# Patient Record
Sex: MALE | Race: WHITE | Employment: OTHER | URBAN - METROPOLITAN AREA
[De-identification: names, ages, dates, MRNs, and addresses within clinical notes are randomized per-mention and may not be internally consistent; named-entity substitution may affect disease eponyms.]

---

## 2017-04-28 RX ORDER — LANSOPRAZOLE 30 MG/1
30 CAPSULE, DELAYED RELEASE ORAL
Qty: 90 CAP | Refills: 3 | Status: SHIPPED | OUTPATIENT
Start: 2017-04-28 | End: 2018-05-25 | Stop reason: SDUPTHER

## 2017-08-11 ENCOUNTER — TELEPHONE (OUTPATIENT)
Dept: INTERNAL MEDICINE CLINIC | Age: 57
End: 2017-08-11

## 2017-08-11 NOTE — TELEPHONE ENCOUNTER
Patient went to get his Lansoprazole from the pharmacy but was not able to. It is requiring a prior auth.

## 2017-11-02 ENCOUNTER — HOSPITAL ENCOUNTER (OUTPATIENT)
Dept: LAB | Age: 57
Discharge: HOME | End: 2017-11-02
Payer: COMMERCIAL

## 2017-11-02 LAB
ALBUMIN SERPL-MCNC: 4.6 G/DL (ref 3.4–5)
ALBUMIN/GLOB SERPL: 1.5 {RATIO} (ref 0.8–1.7)
ALP SERPL-CCNC: 94 U/L (ref 45–117)
ALT SERPL-CCNC: 87 U/L (ref 16–61)
ANION GAP SERPL CALC-SCNC: 7 MMOL/L (ref 3–18)
APPEARANCE UR: CLEAR
AST SERPL-CCNC: 34 U/L (ref 15–37)
BILIRUB SERPL-MCNC: 0.9 MG/DL (ref 0.2–1)
BILIRUB UR QL: NEGATIVE
BUN SERPL-MCNC: 19 MG/DL (ref 7–18)
BUN/CREAT SERPL: 19 (ref 12–20)
CALCIUM SERPL-MCNC: 9.5 MG/DL (ref 8.5–10.1)
CHLORIDE SERPL-SCNC: 103 MMOL/L (ref 100–108)
CHOLEST SERPL-MCNC: 188 MG/DL
CO2 SERPL-SCNC: 31 MMOL/L (ref 21–32)
COLOR UR: YELLOW
CREAT SERPL-MCNC: 0.99 MG/DL (ref 0.6–1.3)
ERYTHROCYTE [DISTWIDTH] IN BLOOD BY AUTOMATED COUNT: 13.4 % (ref 11.6–14.5)
GLOBULIN SER CALC-MCNC: 3.1 G/DL (ref 2–4)
GLUCOSE SERPL-MCNC: 94 MG/DL (ref 74–99)
GLUCOSE UR STRIP.AUTO-MCNC: NEGATIVE MG/DL
HCT VFR BLD AUTO: 50.6 % (ref 36–48)
HDLC SERPL-MCNC: 53 MG/DL (ref 40–60)
HDLC SERPL: 3.5 {RATIO} (ref 0–5)
HGB BLD-MCNC: 16.9 G/DL (ref 13–16)
HGB UR QL STRIP: NEGATIVE
KETONES UR QL STRIP.AUTO: NEGATIVE MG/DL
LDLC SERPL CALC-MCNC: 116.8 MG/DL (ref 0–100)
LEUKOCYTE ESTERASE UR QL STRIP.AUTO: NEGATIVE
LIPID PROFILE,FLP: ABNORMAL
MCH RBC QN AUTO: 32.9 PG (ref 24–34)
MCHC RBC AUTO-ENTMCNC: 33.4 G/DL (ref 31–37)
MCV RBC AUTO: 98.4 FL (ref 74–97)
NITRITE UR QL STRIP.AUTO: NEGATIVE
PH UR STRIP: 5.5 [PH] (ref 5–8)
PLATELET # BLD AUTO: 217 K/UL (ref 135–420)
PMV BLD AUTO: 10.5 FL (ref 9.2–11.8)
POTASSIUM SERPL-SCNC: 4.2 MMOL/L (ref 3.5–5.5)
PROT SERPL-MCNC: 7.7 G/DL (ref 6.4–8.2)
PROT UR STRIP-MCNC: NEGATIVE MG/DL
RBC # BLD AUTO: 5.14 M/UL (ref 4.7–5.5)
SODIUM SERPL-SCNC: 141 MMOL/L (ref 136–145)
SP GR UR REFRACTOMETRY: 1.02 (ref 1–1.03)
TRIGL SERPL-MCNC: 91 MG/DL (ref ?–150)
UROBILINOGEN UR QL STRIP.AUTO: 0.2 EU/DL (ref 0.2–1)
VLDLC SERPL CALC-MCNC: 18.2 MG/DL
WBC # BLD AUTO: 6.6 K/UL (ref 4.6–13.2)

## 2017-11-02 PROCEDURE — 80053 COMPREHEN METABOLIC PANEL: CPT | Performed by: INTERNAL MEDICINE

## 2017-11-02 PROCEDURE — 85027 COMPLETE CBC AUTOMATED: CPT | Performed by: INTERNAL MEDICINE

## 2017-11-02 PROCEDURE — 81003 URINALYSIS AUTO W/O SCOPE: CPT | Performed by: INTERNAL MEDICINE

## 2017-11-02 PROCEDURE — 80061 LIPID PANEL: CPT | Performed by: INTERNAL MEDICINE

## 2017-11-02 PROCEDURE — 36415 COLL VENOUS BLD VENIPUNCTURE: CPT | Performed by: INTERNAL MEDICINE

## 2017-11-07 PROBLEM — K76.0 FATTY LIVER: Status: ACTIVE | Noted: 2017-11-07

## 2017-11-07 PROBLEM — N13.8 BPH WITH OBSTRUCTION/LOWER URINARY TRACT SYMPTOMS: Status: ACTIVE | Noted: 2017-11-07

## 2017-11-07 PROBLEM — N40.1 BPH WITH OBSTRUCTION/LOWER URINARY TRACT SYMPTOMS: Status: ACTIVE | Noted: 2017-11-07

## 2017-11-07 NOTE — PROGRESS NOTES
59 y.o. white male who presents for RPE    Reports no cardiovascular complaints. He remains active with household and lawn chores although no set exercise regimen. He tried to get the treadmill going but it  on him. He has the free time being retired, just not going. BP up today but not previously documented     Vitals 2017 10/28/2016 10/26/2015 2015 2014   Blood Pressure 144/86 138/88 136/84 112/76 132/78     The gerd is controlled and no other gi complaints    No urinary complaints outside of mild hesitation    He has a mouth lesion he wants looked at     Past Medical History:   Diagnosis Date    BPH with obstruction/lower urinary tract symptoms     I-PSS 13 from     Calculus of kidney     Colon polyp     Dr Gabriel Moreira 9/10    Depression     Dyslipidemia     calculated 10 year risk score 7.6% (10/15); 6.4% (10/16)    Fatty liver     Us  negative serologies; Fib-4 was 0.87 from ()    FHx: heart disease     GERD (gastroesophageal reflux disease)     EGD 10/14 Dr Gerald Asif    Left-sided tinnitus     and hearing loss; saw Dr Eleno Serras ?    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 [email or web address]      Social History Main Topics    Smoking status: Former Smoker    Smokeless tobacco: Former User    Alcohol use Yes      Comment: occasionally    Drug use: No    Sexual activity: Not on file     Other Topics Concern    Not on file     Social History Narrative     Family History   Problem Relation Age of Onset    Cancer Mother 80     gallbadder cancer    Dementia Mother     Heart Disease Father     Hypertension Father      Immunization History   Administered Date(s) Administered    Influenza Vaccine (Quad) PF 11/09/2017    Tdap 12/09/2014     Current Outpatient Prescriptions   Medication Sig    lansoprazole (PREVACID) 30 mg capsule Take 1 Cap by mouth Daily (before breakfast). No current facility-administered medications for this visit. Allergies   Allergen Reactions    Aleve [Naproxen Sodium] Rash     REVIEW OF SYSTEMS: colo 9/10 Dr Jakob Love  Ophtho - no vision change or eye pain  Oral - no mouth pain, tongue or tooth problems  Ears - no hearing loss, ear pain, fullness, no swallowing problems  Cardiac - no CP, PND, orthopnea, edema, palpitations or syncope  Chest - no breast masses  Resp - no wheezing, chronic coughing, dyspnea  Genitals - no genital lesions, discharge, masses, ulceration, warts  Ortho - no swelling, dec ROM, myalgias  Derm - no nail abnormalities, rashes, lesions of note, hair loss  Psych - denies any anxiety or depression symptoms, no hallucinations or violent ideation  Constitutional - no wt loss, night sweats, unexplained fevers  Neuro - no focal weakness, numbness, paresthesias, incoordination, ataxia, involuntary movements  Endo - no polyuria, polydipsia, nocturia, hot flashes    Visit Vitals    /86 (BP 1 Location: Right arm, BP Patient Position: Sitting)    Pulse 71    Temp 98 °F (36.7 °C) (Oral)    Resp 14    Ht 5' 10.25\" (1.784 m)    Wt 208 lb 9.6 oz (94.6 kg)    SpO2 97%    BMI 29.72 kg/m2   Affect is appropriate. Mood stable  No apparent distress  HEENT --Anicteric sclerae, tympanic membranes normal,  ear canals normal.  PERRL, EOMI, conjunctiva and lids normal.  Disks were sharp  Sinuses were nontender, turbinates normal, hearing normal.  Oropharynx without  erythema, normal tongue, oral mucosa and tonsils. No thyromegaly, JVD, or bruits. Slightly raised purplish lesion about 8mm diameter left inner upper lip  Lungs --Clear to auscultation, normal percussion.   Heart --Regular rate and rhythm, no murmurs, rubs, gallops, or clicks. Chest wall --Nontender to palpation. PMI normal.  Abdomen -- Soft and nontender, no hepatosplenomegaly or masses. Reducible umbilical hernia, also ventral hernia  Prostate  -- no asymmetry, nodularity, tenderness, about 25 gm  Rectal  -- normal tone, guiaiac negative brown stool  Extremities -- Without cyanosis, clubbing, edema. 2+ pulses equally and bilaterally.     LABS  From 7/10 showed   gluc 83, cr 0.90, gfr>60, alt 63,        chol 156, tg 67,   hdl 46, ldl-c 97,   wbc 5.3, hb 16.1, plt 198, tsh 1.67, psa 0.60  From 7/14 showed   gluc 88, cr 1.04, gfr>60, alt 57, hba1c 5.3, chol 187, tg 194, hdl 43, ldl-c 105, wbc 7.1, hb 17.6, plt 207, tsh 1.80, psa 0.90, ua neg  From 8/14 showed                 alt 71,                                 ast 34, ap 83, tb 0.6, hep b/c neg, bhavin neg, fe 101, %sat 31, ferritin 241, cerulo 17.5  From 2/15 showed   gluc 96, cr 1.02, gfr>60, alt 37  From 10/15 showed gluc 92, cr 0.95, gfr>60, alt 27,        chol 185, tg 104, hdl 44, ldl-c 120  From 10/16 showed gluc 92, cr 1.07, gfr>60, alt 62,        chol 194, tg 47,   hdl 59, ldl-c 126, wbc 5.4, hb 16.7, plt 185,            psa 0.80, ua neg    Results for orders placed or performed during the hospital encounter of 11/02/17   CBC W/O DIFF   Result Value Ref Range    WBC 6.6 4.6 - 13.2 K/uL    RBC 5.14 4.70 - 5.50 M/uL    HGB 16.9 (H) 13.0 - 16.0 g/dL    HCT 50.6 (H) 36.0 - 48.0 %    MCV 98.4 (H) 74.0 - 97.0 FL    MCH 32.9 24.0 - 34.0 PG    MCHC 33.4 31.0 - 37.0 g/dL    RDW 13.4 11.6 - 14.5 %    PLATELET 677 989 - 020 K/uL    MPV 10.5 9.2 - 11.8 FL   LIPID PANEL   Result Value Ref Range    LIPID PROFILE          Cholesterol, total 188 <200 MG/DL    Triglyceride 91 <150 MG/DL    HDL Cholesterol 53 40 - 60 MG/DL    LDL, calculated 116.8 (H) 0 - 100 MG/DL    VLDL, calculated 18.2 MG/DL    CHOL/HDL Ratio 3.5 0 - 5.0     METABOLIC PANEL, COMPREHENSIVE   Result Value Ref Range    Sodium 141 136 - 145 mmol/L    Potassium 4.2 3.5 - 5.5 mmol/L    Chloride 103 100 - 108 mmol/L    CO2 31 21 - 32 mmol/L    Anion gap 7 3.0 - 18 mmol/L    Glucose 94 74 - 99 mg/dL    BUN 19 (H) 7.0 - 18 MG/DL    Creatinine 0.99 0.6 - 1.3 MG/DL    BUN/Creatinine ratio 19 12 - 20      GFR est AA >60 >60 ml/min/1.73m2    GFR est non-AA >60 >60 ml/min/1.73m2    Calcium 9.5 8.5 - 10.1 MG/DL    Bilirubin, total 0.9 0.2 - 1.0 MG/DL    ALT (SGPT) 87 (H) 16 - 61 U/L    AST (SGOT) 34 15 - 37 U/L    Alk. phosphatase 94 45 - 117 U/L    Protein, total 7.7 6.4 - 8.2 g/dL    Albumin 4.6 3.4 - 5.0 g/dL    Globulin 3.1 2.0 - 4.0 g/dL    A-G Ratio 1.5 0.8 - 1.7     URINALYSIS W/ RFLX MICROSCOPIC   Result Value Ref Range    Color YELLOW      Appearance CLEAR      Specific gravity 1.021 1.005 - 1.030      pH (UA) 5.5 5.0 - 8.0      Protein NEGATIVE  NEG mg/dL    Glucose NEGATIVE  NEG mg/dL    Ketone NEGATIVE  NEG mg/dL    Bilirubin NEGATIVE  NEG      Blood NEGATIVE  NEG      Urobilinogen 0.2 0.2 - 1.0 EU/dL    Nitrites NEGATIVE  NEG      Leukocyte Esterase NEGATIVE  NEG       Calculated 10 year risk score was 8.7%    Patient Active Problem List   Diagnosis Code    Dyslipidemia E78.5    Colon polyp Dr Timbo Romero 2010 K63.5    Gastroesophageal reflux disease without esophagitis K21.9    Fatty liver K76.0    BPH with obstruction/lower urinary tract symptoms N40.1, N13.8     Assessment and plan:  1. GERD. Continue ppi and avoidance measures  2. Dyslipidemia. Dietary and lifestyle measures for now, declined meds. Will inc exercise levels  3. BPH/LUTS. Declined meds or urology eval as minimal sx  4. Fatty liver. Wt loss, inc exercise  5. Polyp. Fiber, colo 2020  6. Mouth lesion. Sched Dr Radha Smallwood group  7. Elev bp. Will follow outside. DASH diet, exercise, wt loss, call if persistently elevated        RTC 10/18    Above conditions discussed at length and patient vocalized understanding.   All questions answered to patient satifaction

## 2017-11-09 ENCOUNTER — OFFICE VISIT (OUTPATIENT)
Dept: INTERNAL MEDICINE CLINIC | Age: 57
End: 2017-11-09

## 2017-11-09 ENCOUNTER — HOSPITAL ENCOUNTER (OUTPATIENT)
Dept: LAB | Age: 57
Discharge: HOME | End: 2017-11-09
Payer: COMMERCIAL

## 2017-11-09 VITALS
RESPIRATION RATE: 14 BRPM | WEIGHT: 208.6 LBS | SYSTOLIC BLOOD PRESSURE: 144 MMHG | BODY MASS INDEX: 29.86 KG/M2 | HEART RATE: 71 BPM | HEIGHT: 70 IN | TEMPERATURE: 98 F | DIASTOLIC BLOOD PRESSURE: 86 MMHG | OXYGEN SATURATION: 97 %

## 2017-11-09 DIAGNOSIS — N40.1 BPH WITH OBSTRUCTION/LOWER URINARY TRACT SYMPTOMS: ICD-10-CM

## 2017-11-09 DIAGNOSIS — K13.70 MOUTH LESION: ICD-10-CM

## 2017-11-09 DIAGNOSIS — Z00.00 PHYSICAL EXAM: ICD-10-CM

## 2017-11-09 DIAGNOSIS — K21.9 GASTROESOPHAGEAL REFLUX DISEASE WITHOUT ESOPHAGITIS: ICD-10-CM

## 2017-11-09 DIAGNOSIS — Z00.00 PHYSICAL EXAM: Primary | ICD-10-CM

## 2017-11-09 DIAGNOSIS — N13.8 BPH WITH OBSTRUCTION/LOWER URINARY TRACT SYMPTOMS: ICD-10-CM

## 2017-11-09 DIAGNOSIS — Z12.5 SCREENING PSA (PROSTATE SPECIFIC ANTIGEN): ICD-10-CM

## 2017-11-09 DIAGNOSIS — K76.0 FATTY LIVER: ICD-10-CM

## 2017-11-09 DIAGNOSIS — Z23 ENCOUNTER FOR IMMUNIZATION: ICD-10-CM

## 2017-11-09 DIAGNOSIS — E78.5 DYSLIPIDEMIA: ICD-10-CM

## 2017-11-09 DIAGNOSIS — K63.5 HYPERPLASTIC COLONIC POLYP, UNSPECIFIED PART OF COLON: ICD-10-CM

## 2017-11-09 PROCEDURE — 84153 ASSAY OF PSA TOTAL: CPT | Performed by: INTERNAL MEDICINE

## 2017-11-09 NOTE — PROGRESS NOTES
1. Have you been to the ER, urgent care clinic or hospitalized since your last visit? NO.     2. Have you seen or consulted any other health care providers outside of the [address] [place] [address] since your last visit (Include any pap smears or colon screening)? NO      Do you have an Advanced Directive? NO    Would you like information on Advanced Directives?  NO

## 2017-11-09 NOTE — PROGRESS NOTES
VO from Dr. Yovanny Bloom for Regular Influenza. Regular Influenza given in Left Deltoid. No pain or reactions noted at injection site.

## 2017-11-09 NOTE — ACP (ADVANCE CARE PLANNING)
1. Have you been to the ER, urgent care clinic or hospitalized since your last visit? NO.     2. Have you seen or consulted any other health care providers outside of the [address] [place] [address] since your last visit (Include any pap smears or colon screening)? NO      Do you have an Advanced Directive? NO    Would you like information on Advanced Directives?  NO

## 2017-11-09 NOTE — MR AVS SNAPSHOT
Visit Information Date & Time Provider Department Dept. Phone Encounter #  
 11/9/2017 10:30 AM Sae Hudson MD [facility] [phone] Your Appointments 11/8/2018  8:05 AM  
LAB with [facility] NURSE VISIT [facility] Appt Note: lab  
 [address], [address] [facility] [address] [place]  
  
   
 [address], [address]  
  
    
 11/15/2018 10:00 AM  
PHYSICAL with Sae Hudson MD  
[facility]) Appt Note: [address] rd  
 [address] [facility], [address] [address] [address] [place]  
  
   
 [address], [address] Upcoming Health Maintenance Date Due Influenza Age 5 to Adult 8/1/2017 COLONOSCOPY 9/1/2020 DTaP/Tdap/Td series (2 - Td) 12/9/2024 Allergies as of 11/9/2017  Review Complete On: 11/9/2017 By: Rochelle Musa Severity Noted Reaction Type Reactions Aleve [Naproxen Sodium]  08/04/2014    Rash Current Immunizations  Reviewed on 8/4/2014 Name Date Influenza Vaccine (Quad) PF  Incomplete Tdap 12/9/2014 Not reviewed this visit You Were Diagnosed With   
  
 Codes Comments Screening PSA (prostate specific antigen)    -  Primary ICD-10-CM: Z12.5 ICD-9-CM: V76.44 Encounter for immunization     ICD-10-CM: T34 ICD-9-CM: V03.89 Vitals BP Pulse Temp Resp Height(growth percentile) Weight(growth percentile) (!) 144/98 (BP 1 Location: Right arm, BP Patient Position: Sitting) 71 98 °F (36.7 °C) (Oral) 14 5' 10.25\" (1.784 m) 208 lb 9.6 oz (94.6 kg) SpO2 BMI Smoking Status 97% 29.72 kg/m2 Former Smoker Vitals History BMI and BSA Data Body Mass Index Body Surface Area  
 29.72 kg/m 2 2.17 m 2 Preferred Pharmacy Pharmacy Name Phone [address] [facility], [address] [place] [address] [phone] Your Updated Medication List  
  
   
This list is accurate as of: 11/9/17 11:45 AM.  Always use your most recent med list.  
  
  
  
  
 lansoprazole 30 mg capsule Commonly known as:  PREVACID Take 1 Cap by mouth Daily (before breakfast). We Performed the Following INFLUENZA VIRUS VAC QUAD,SPLIT,PRESV FREE SYRINGE IM [id number] CPT(R)] Introducing hospitals & HEALTH SERVICES! Dorinda Nguyen introduces [employer] patient portal. Now you can access parts of your medical record, email your doctor's office, and request medication refills online. 1. In your internet browser, go to [email or web address] 2. Click on the First Time User? Click Here link in the Sign In box. You will see the New Member Sign Up page. 3. Enter your [employer] Access Code exactly as it appears below. You will not need to use this code after youve completed the sign-up process. If you do not sign up before the expiration date, you must request a new code. · [employer] Access Code: [email or web address] Expires: 2/7/2018 11:29 AM 
 
4. Enter the last four digits of your Social Security Number (xxxx) and Date of Birth (mm/dd/yyyy) as indicated and click Submit. You will be taken to the next sign-up page. 5. Create a [employer] ID. This will be your [employer] login ID and cannot be changed, so think of one that is secure and easy to remember. 6. Create a [employer] password. You can change your password at any time. 7. Enter your Password Reset Question and Answer. This can be used at a later time if you forget your password. 8. Enter your e-mail address. You will receive e-mail notification when new information is available in [address] 9. Click Sign Up. You can now view and download portions of your medical record. 10. Click the Download Summary menu link to download a portable copy of your medical information. If you have questions, please visit the Frequently Asked Questions section of the [employer] website. Remember, [employer] is NOT to be used for urgent needs. For medical emergencies, dial 911. Now available from your iPhone and Android! Please provide this summary of care documentation to your next provider. Your primary care clinician is listed as Drinda Epley. If you have any questions after today's visit, please call [phone].

## 2017-11-10 ENCOUNTER — TELEPHONE (OUTPATIENT)
Dept: INTERNAL MEDICINE CLINIC | Age: 57
End: 2017-11-10

## 2017-11-10 LAB — PSA SERPL-MCNC: 1.4 NG/ML (ref 0–4)

## 2017-11-10 NOTE — TELEPHONE ENCOUNTER
Lab calling asking to talk to Yunior says they got tube and order for psa.  Did KVNG correa lipid and Met panel also

## 2017-11-10 NOTE — TELEPHONE ENCOUNTER
Called Trista at [facility] lab and informed her that the only lab to be done is PSA and the others were future dates.

## 2018-01-11 ENCOUNTER — TELEPHONE (OUTPATIENT)
Dept: INTERNAL MEDICINE CLINIC | Age: 58
End: 2018-01-11

## 2018-01-16 NOTE — TELEPHONE ENCOUNTER
Pt called back to inq about getting a letter to dismiss him from jury duty, he said he has an enlarged prostate and it would be uncomfortable for him to sit tht long, pls adv, pt would like to discuss with the nurse or RD what to put in the note, he needs by 01/19/18

## 2018-01-16 NOTE — TELEPHONE ENCOUNTER
Fax letter to [employer]  Fax: [phone]    Signed release is in medical records. Please let patient know once this has been faxed.

## 2018-05-25 RX ORDER — LANSOPRAZOLE 30 MG/1
30 CAPSULE, DELAYED RELEASE ORAL
Qty: 90 CAP | Refills: 3 | Status: SHIPPED | OUTPATIENT
Start: 2018-05-25 | End: 2019-08-07 | Stop reason: SDUPTHER

## 2018-10-29 ENCOUNTER — TELEPHONE (OUTPATIENT)
Dept: INTERNAL MEDICINE CLINIC | Age: 58
End: 2018-10-29

## 2018-11-08 ENCOUNTER — APPOINTMENT (OUTPATIENT)
Dept: INTERNAL MEDICINE CLINIC | Age: 58
End: 2018-11-08

## 2018-11-08 ENCOUNTER — HOSPITAL ENCOUNTER (OUTPATIENT)
Dept: LAB | Age: 58
Discharge: HOME | End: 2018-11-08
Payer: COMMERCIAL

## 2018-11-08 DIAGNOSIS — Z00.00 PHYSICAL EXAM: ICD-10-CM

## 2018-11-08 LAB
ALBUMIN SERPL-MCNC: 4.1 G/DL (ref 3.4–5)
ALBUMIN/GLOB SERPL: 1.2 {RATIO} (ref 0.8–1.7)
ALP SERPL-CCNC: 89 U/L (ref 45–117)
ALT SERPL-CCNC: 74 U/L (ref 16–61)
ANION GAP SERPL CALC-SCNC: 6 MMOL/L (ref 3–18)
AST SERPL-CCNC: 33 U/L (ref 15–37)
BILIRUB SERPL-MCNC: 0.8 MG/DL (ref 0.2–1)
BUN SERPL-MCNC: 16 MG/DL (ref 7–18)
BUN/CREAT SERPL: 16 (ref 12–20)
CALCIUM SERPL-MCNC: 9 MG/DL (ref 8.5–10.1)
CHLORIDE SERPL-SCNC: 105 MMOL/L (ref 100–108)
CHOLEST SERPL-MCNC: 193 MG/DL
CO2 SERPL-SCNC: 29 MMOL/L (ref 21–32)
CREAT SERPL-MCNC: 1.03 MG/DL (ref 0.6–1.3)
ERYTHROCYTE [DISTWIDTH] IN BLOOD BY AUTOMATED COUNT: 13 % (ref 11.6–14.5)
GLOBULIN SER CALC-MCNC: 3.4 G/DL (ref 2–4)
GLUCOSE SERPL-MCNC: 96 MG/DL (ref 74–99)
HCT VFR BLD AUTO: 48.1 % (ref 36–48)
HDLC SERPL-MCNC: 46 MG/DL (ref 40–60)
HDLC SERPL: 4.2 {RATIO} (ref 0–5)
HGB BLD-MCNC: 16.5 G/DL (ref 13–16)
LDLC SERPL CALC-MCNC: 130.8 MG/DL (ref 0–100)
LIPID PROFILE,FLP: ABNORMAL
MCH RBC QN AUTO: 33 PG (ref 24–34)
MCHC RBC AUTO-ENTMCNC: 34.3 G/DL (ref 31–37)
MCV RBC AUTO: 96.2 FL (ref 74–97)
PLATELET # BLD AUTO: 207 K/UL (ref 135–420)
PMV BLD AUTO: 10.4 FL (ref 9.2–11.8)
POTASSIUM SERPL-SCNC: 4.5 MMOL/L (ref 3.5–5.5)
PROT SERPL-MCNC: 7.5 G/DL (ref 6.4–8.2)
RBC # BLD AUTO: 5 M/UL (ref 4.7–5.5)
SODIUM SERPL-SCNC: 140 MMOL/L (ref 136–145)
TRIGL SERPL-MCNC: 81 MG/DL (ref ?–150)
VLDLC SERPL CALC-MCNC: 16.2 MG/DL
WBC # BLD AUTO: 6 K/UL (ref 4.6–13.2)

## 2018-11-08 PROCEDURE — 80061 LIPID PANEL: CPT

## 2018-11-08 PROCEDURE — 80053 COMPREHEN METABOLIC PANEL: CPT

## 2018-11-08 PROCEDURE — 36415 COLL VENOUS BLD VENIPUNCTURE: CPT

## 2018-11-08 PROCEDURE — 85027 COMPLETE CBC AUTOMATED: CPT

## 2018-11-11 NOTE — PROGRESS NOTES
59 y.o. white male who presents for RPE    Reports no cardiovascular complaints. He remains active with chores and lawn work. He goes to then gym periodically to play bball, toning but not regularly. His bp has been elevated as noted below. Vitals 11/15/2018 11/9/2017 10/28/2016 10/26/2015   Blood Pressure 150/90 144/86 138/88 136/84     Denies gi complaints     No urinary complaints    His wife tried to do the intermittent fasting but apparently did not stick with it. Trying some variant of low carb also but no success with attempts at wt loss    Vitals 11/15/2018 11/9/2017 10/28/2016 10/26/2015 2/9/2015   Weight 203 lb 208 lb 9.6 oz 198 lb 198 lb 192 lb     Vitals 12/9/2014 8/4/2014   Weight 188 lb 207 lb 8 oz     Past Medical History:   Diagnosis Date    BPH with obstruction/lower urinary tract symptoms     I-PSS 13 from 8/14    Calculus of kidney     Colon polyp     Dr Cj Singh 9/10    Depression     Dyslipidemia     calculated 10 year risk score 7.6% (10/15); 6.4% (10/16)    Fatty liver     Us 8/14 negative serologies; Fib-4 was 0.87 from (7/14)    FHx: heart disease     GERD (gastroesophageal reflux disease)     EGD 10/14 Dr Rex Castro    Left-sided tinnitus     and hearing loss; saw Dr Douglas Geller 2010?     Overweight(278.02)     peak weight 207 lbs, bmi 29.8 from 8/14; IF 11/18 start weight 203 lbs    Primary hypertension 43/29/0794    Umbilical hernia     and ventral hernia     Past Surgical History:   Procedure Laterality Date    HX COLONOSCOPY      Dr Cj Singh polyp 9/10     Social History     Socioeconomic History    Marital status:      Spouse name: Not on file    Number of children: 3    Years of education: Not on file    Highest education level: Not on file   Social Needs    Financial resource strain: Not on file    Food insecurity - worry: Not on file    Food insecurity - inability: Not on file   Qoniac needs - medical: Not on file   Qoniac needs - non-medical: Not on file   Occupational History    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 [email or web address]   Tobacco Use    Smoking status: Former Smoker    Smokeless tobacco: Former User   Substance and Sexual Activity    Alcohol use: Yes     Comment: occasionally    Drug use: No    Sexual activity: Not on file   Other Topics Concern    Not on file   Social History Narrative    Not on file     Family History   Problem Relation Age of Onset    Cancer Mother 80        gallbadder cancer    Dementia Mother     Heart Disease Father     Hypertension Father      Immunization History   Administered Date(s) Administered    Influenza Vaccine (Quad) PF 11/09/2017, 11/15/2018    Tdap 12/09/2014     Current Outpatient Medications   Medication Sig    amLODIPine (NORVASC) 5 mg tablet Take 1 Tab by mouth daily.  lansoprazole (PREVACID) 30 mg capsule Take 1 Cap by mouth Daily (before breakfast). No current facility-administered medications for this visit.       Allergies   Allergen Reactions    Aleve [Naproxen Sodium] Rash     REVIEW OF SYSTEMS: colo 9/10 Dr Adriane Hawkins  Ophtho - no vision change or eye pain  Oral - no mouth pain, tongue or tooth problems  Ears - no hearing loss, ear pain, fullness, no swallowing problems  Cardiac - no CP, PND, orthopnea, edema, palpitations or syncope  Chest - no breast masses  Resp - no wheezing, chronic coughing, dyspnea  Genitals - no genital lesions, discharge, masses, ulceration, warts  Ortho - no swelling, dec ROM, myalgias  Derm - no nail abnormalities, rashes, lesions of note, hair loss  Psych - denies any anxiety or depression symptoms, no hallucinations or violent ideation  Constitutional - no wt loss, night sweats, unexplained fevers  Neuro - no focal weakness, numbness, paresthesias, incoordination, ataxia, involuntary movements  Endo - no polyuria, polydipsia, nocturia, hot flashes    Visit Vitals  /90   Pulse 72   Temp 98.3 °F (36.8 °C) (Oral)   Resp 14   Ht 5' 10.25\" (1.784 m) Wt 203 lb (92.1 kg)   SpO2 99%   BMI 28.92 kg/m²   Affect is appropriate. Mood stable  No apparent distress  HEENT --Anicteric sclerae, tympanic membranes normal,  ear canals normal.  PERRL, EOMI, conjunctiva and lids normal.  Disks were sharp  Sinuses were nontender, turbinates normal, hearing normal.  Oropharynx without  erythema, normal tongue, oral mucosa and tonsils. No thyromegaly, JVD, or bruits. Slightly raised purplish lesion about 8mm diameter left inner upper lip  Lungs --Clear to auscultation, normal percussion. Heart --Regular rate and rhythm, no murmurs, rubs, gallops, or clicks. Chest wall --Nontender to palpation. PMI normal.  Abdomen -- Soft and nontender, no hepatosplenomegaly or masses. Reducible umbilical hernia, also ventral hernia  Prostate  -- no asymmetry, nodularity, tenderness, about 25 gm  Rectal  -- normal tone, guiaiac negative brown stool  Extremities -- Without cyanosis, clubbing, edema. 2+ pulses equally and bilaterally.     LABS  From 7/10 showed   gluc 83, cr 0.90, gfr>60, alt 63,        chol 156, tg 67,   hdl 46, ldl-c 97,   wbc 5.3, hb 16.1, plt 198, tsh 1.67, psa 0.60  From 7/14 showed   gluc 88, cr 1.04, gfr>60, alt 57, hba1c 5.3, chol 187, tg 194, hdl 43, ldl-c 105, wbc 7.1, hb 17.6, plt 207, tsh 1.80, psa 0.90, ua neg  From 8/14 showed                 alt 71,                                 ast 34, ap 83, tb 0.6, hep b/c neg, bhavin neg, fe 101, %sat 31, ferritin 241, cerulo 17.5  From 2/15 showed   gluc 96, cr 1.02, gfr>60, alt 37  From 10/15 showed gluc 92, cr 0.95, gfr>60, alt 27,        chol 185, tg 104, hdl 44, ldl-c 120  From 10/16 showed gluc 92, cr 1.07, gfr>60, alt 62,        chol 194, tg 47,   hdl 59, ldl-c 126, wbc 5.4, hb 16.7, plt 185,            psa 0.80, ua neg  From 10/17 showed gluc 94, cr 0.99, gfr>60, alt 87,        chol 188, tg 91,   hdl 53, ldl-c 117, wbc 6.6, hb 16.9, plt 257,            psa 1.40, ast 30, ap 94, tb 0.9    Results for orders placed or performed during the hospital encounter of 11/08/18   CBC W/O DIFF   Result Value Ref Range    WBC 6.0 4.6 - 13.2 K/uL    RBC 5.00 4.70 - 5.50 M/uL    HGB 16.5 (H) 13.0 - 16.0 g/dL    HCT 48.1 (H) 36.0 - 48.0 %    MCV 96.2 74.0 - 97.0 FL    MCH 33.0 24.0 - 34.0 PG    MCHC 34.3 31.0 - 37.0 g/dL    RDW 13.0 11.6 - 14.5 %    PLATELET 747 688 - 521 K/uL    MPV 10.4 9.2 - 11.8 FL   LIPID PANEL   Result Value Ref Range    LIPID PROFILE          Cholesterol, total 193 <200 MG/DL    Triglyceride 81 <150 MG/DL    HDL Cholesterol 46 40 - 60 MG/DL    LDL, calculated 130.8 (H) 0 - 100 MG/DL    VLDL, calculated 16.2 MG/DL    CHOL/HDL Ratio 4.2 0 - 5.0     METABOLIC PANEL, COMPREHENSIVE   Result Value Ref Range    Sodium 140 136 - 145 mmol/L    Potassium 4.5 3.5 - 5.5 mmol/L    Chloride 105 100 - 108 mmol/L    CO2 29 21 - 32 mmol/L    Anion gap 6 3.0 - 18 mmol/L    Glucose 96 74 - 99 mg/dL    BUN 16 7.0 - 18 MG/DL    Creatinine 1.03 0.6 - 1.3 MG/DL    BUN/Creatinine ratio 16 12 - 20      GFR est AA >60 >60 ml/min/1.73m2    GFR est non-AA >60 >60 ml/min/1.73m2    Calcium 9.0 8.5 - 10.1 MG/DL    Bilirubin, total 0.8 0.2 - 1.0 MG/DL    ALT (SGPT) 74 (H) 16 - 61 U/L    AST (SGOT) 33 15 - 37 U/L    Alk. phosphatase 89 45 - 117 U/L    Protein, total 7.5 6.4 - 8.2 g/dL    Albumin 4.1 3.4 - 5.0 g/dL    Globulin 3.4 2.0 - 4.0 g/dL    A-G Ratio 1.2 0.8 - 1.7       Calculated 10 year risk score was 13.5%    Patient Active Problem List   Diagnosis Code    Dyslipidemia E78.5    Colon polyp Dr Adali Bocanegra 2010 K63.5    Gastroesophageal reflux disease without esophagitis K21.9    Fatty liver K76.0    BPH with obstruction/lower urinary tract symptoms N40.1, N13.8    Primary hypertension I10     Assessment and plan:  1. GERD. Continue ppi and avoidance measures  2. Dyslipidemia. Dietary and lifestyle measures for now, declined meds, recheck next visit  3. BPH/LUTS. Observation, check psa  4. Fatty liver. Wt loss, inc exercise  5. Polyp. Fiber, colo 2020  6. HTN. Long discussion about aha/acc guidelines  Will start amlo after discussing possible sfx  7. Flu given  8. Overweight. Intermittent fasting discussed at length. Explained the concepts in detail. Went over possible physiologic changes that could occur and how that would possibly impact his situation in a positive way. Discussed 16:8 program in particular. We also went over the differences between hunger and alma delia hypoglycemia. Look up low insulin index foods. He will do some more research and consider implementing in the near future, standard handout given        RTC 10/18    Above conditions discussed at length and patient vocalized understanding.   All questions answered to patient satisfaction

## 2018-11-15 ENCOUNTER — OFFICE VISIT (OUTPATIENT)
Dept: INTERNAL MEDICINE CLINIC | Age: 58
End: 2018-11-15

## 2018-11-15 VITALS
WEIGHT: 203 LBS | TEMPERATURE: 98.3 F | HEART RATE: 72 BPM | HEIGHT: 70 IN | BODY MASS INDEX: 29.06 KG/M2 | OXYGEN SATURATION: 99 % | SYSTOLIC BLOOD PRESSURE: 150 MMHG | DIASTOLIC BLOOD PRESSURE: 90 MMHG | RESPIRATION RATE: 14 BRPM

## 2018-11-15 DIAGNOSIS — K63.5 HYPERPLASTIC COLONIC POLYP, UNSPECIFIED PART OF COLON: ICD-10-CM

## 2018-11-15 DIAGNOSIS — K76.0 FATTY LIVER: ICD-10-CM

## 2018-11-15 DIAGNOSIS — Z23 ENCOUNTER FOR IMMUNIZATION: ICD-10-CM

## 2018-11-15 DIAGNOSIS — K21.9 GASTROESOPHAGEAL REFLUX DISEASE WITHOUT ESOPHAGITIS: ICD-10-CM

## 2018-11-15 DIAGNOSIS — I10 PRIMARY HYPERTENSION: ICD-10-CM

## 2018-11-15 DIAGNOSIS — E78.5 DYSLIPIDEMIA: ICD-10-CM

## 2018-11-15 DIAGNOSIS — Z00.00 PHYSICAL EXAM: Primary | ICD-10-CM

## 2018-11-15 RX ORDER — AMLODIPINE BESYLATE 5 MG/1
5 TABLET ORAL DAILY
Qty: 30 TAB | Refills: 5 | Status: SHIPPED | OUTPATIENT
Start: 2018-11-15 | End: 2019-03-19 | Stop reason: DRUGHIGH

## 2018-11-15 NOTE — PROGRESS NOTES
Pamella Arteaga is a 59 y.o. male who presents for routine immunizations. He denies any symptoms , reactions or allergies that would exclude them from being immunized today. Risks and adverse reactions were discussed and the VIS was given to them. All questions were addressed. He was observed for 5 min post injection. There were no reactions observed.     Flor Harris LPN

## 2018-11-15 NOTE — PROGRESS NOTES
1. Have you been to the ER, urgent care clinic or hospitalized since your last visit? NO.     2. Have you seen or consulted any other health care providers outside of the [address] [place] [address] since your last visit (Include any pap smears or colon screening)? NO      Do you have an Advanced Directive? NO    Would you like information on Advanced Directives?  NO    Chief Complaint   Patient presents with    Physical     labs

## 2019-03-11 ENCOUNTER — HOSPITAL ENCOUNTER (OUTPATIENT)
Dept: LAB | Age: 59
Discharge: HOME | End: 2019-03-11
Payer: COMMERCIAL

## 2019-03-11 ENCOUNTER — APPOINTMENT (OUTPATIENT)
Dept: INTERNAL MEDICINE CLINIC | Age: 59
End: 2019-03-11

## 2019-03-11 DIAGNOSIS — E78.5 DYSLIPIDEMIA: ICD-10-CM

## 2019-03-11 DIAGNOSIS — I10 PRIMARY HYPERTENSION: ICD-10-CM

## 2019-03-11 DIAGNOSIS — Z00.00 PHYSICAL EXAM: ICD-10-CM

## 2019-03-11 LAB
ANION GAP SERPL CALC-SCNC: 8 MMOL/L (ref 3–18)
APPEARANCE UR: CLEAR
BILIRUB UR QL: NEGATIVE
BUN SERPL-MCNC: 24 MG/DL (ref 7–18)
BUN/CREAT SERPL: 24 (ref 12–20)
CALCIUM SERPL-MCNC: 9.1 MG/DL (ref 8.5–10.1)
CHLORIDE SERPL-SCNC: 104 MMOL/L (ref 100–108)
CHOLEST SERPL-MCNC: 200 MG/DL
CO2 SERPL-SCNC: 28 MMOL/L (ref 21–32)
COLOR UR: YELLOW
CREAT SERPL-MCNC: 1.02 MG/DL (ref 0.6–1.3)
GLUCOSE SERPL-MCNC: 90 MG/DL (ref 74–99)
GLUCOSE UR STRIP.AUTO-MCNC: NEGATIVE MG/DL
HDLC SERPL-MCNC: 43 MG/DL (ref 40–60)
HDLC SERPL: 4.7 {RATIO} (ref 0–5)
HGB UR QL STRIP: NEGATIVE
KETONES UR QL STRIP.AUTO: 40 MG/DL
LDLC SERPL CALC-MCNC: 118.4 MG/DL (ref 0–100)
LEUKOCYTE ESTERASE UR QL STRIP.AUTO: NEGATIVE
LIPID PROFILE,FLP: ABNORMAL
NITRITE UR QL STRIP.AUTO: NEGATIVE
PH UR STRIP: 5.5 [PH] (ref 5–8)
POTASSIUM SERPL-SCNC: 4.3 MMOL/L (ref 3.5–5.5)
PROT UR STRIP-MCNC: NEGATIVE MG/DL
PSA SERPL-MCNC: 1 NG/ML (ref 0–4)
SODIUM SERPL-SCNC: 140 MMOL/L (ref 136–145)
SP GR UR REFRACTOMETRY: 1.02 (ref 1–1.03)
TRIGL SERPL-MCNC: 193 MG/DL (ref ?–150)
UROBILINOGEN UR QL STRIP.AUTO: 0.2 EU/DL (ref 0.2–1)
VLDLC SERPL CALC-MCNC: 38.6 MG/DL

## 2019-03-11 PROCEDURE — 80061 LIPID PANEL: CPT

## 2019-03-11 PROCEDURE — 84153 ASSAY OF PSA TOTAL: CPT

## 2019-03-11 PROCEDURE — 80048 BASIC METABOLIC PNL TOTAL CA: CPT

## 2019-03-11 PROCEDURE — 81003 URINALYSIS AUTO W/O SCOPE: CPT

## 2019-03-11 PROCEDURE — 36415 COLL VENOUS BLD VENIPUNCTURE: CPT

## 2019-03-15 NOTE — PROGRESS NOTES
62 y.o. white male who presents for f/u    Reports no cardiovascular complaints. Started him on amlodipine at the last visit, no side effects reported as there may be slight ED issues. Denies gi complaints     No urinary complaints    H he is now doing intermittent fasting with his wife and doing ketogenic diet on top of that. He drinks bone broth prior to 2:00 in the eating window is 4 hours to 6:00    Vitals 3/19/2019 11/15/2018 11/9/2017   Weight 204 lb 203 lb 208 lb 9.6 oz     IF 1/19    Past Medical History:   Diagnosis Date    BPH with obstruction/lower urinary tract symptoms     I-PSS 13 from 8/14    Calculus of kidney     Colon polyp     Dr Reynold Carlos 9/10    Depression     Dyslipidemia     calculated 10 year risk score 7.6% (10/15); 6.4% (10/16); 8.7% (11/17); 13.5% (11/18); 13% (3/19)    Erectile dysfunction 2019    Fatty liver     Us 8/14 negative serologies; Fib-4 was 0.87 from (7/14)    FHx: heart disease     GERD (gastroesophageal reflux disease)     EGD 10/14 Dr Morenita Dockery    Left-sided tinnitus     and hearing loss; saw Dr Whitney El 2010?     Overweight(278.02)     peak weight 207 lbs, bmi 29.8 from 8/14; IF 1/19 start weight 203 lbs; doing keto diet also with wife    Primary hypertension 89/09/3284    Umbilical hernia     and ventral hernia     Past Surgical History:   Procedure Laterality Date    HX COLONOSCOPY      Dr Reynold Carlos polyp 9/10     Social History     Socioeconomic History    Marital status:      Spouse name: Not on file    Number of children: 3    Years of education: Not on file    Highest education level: Not on file   Social Needs    Financial resource strain: Not on file    Food insecurity - worry: Not on file    Food insecurity - inability: Not on file   On[employer] needs - medical: Not on file   On[employer] needs - non-medical: Not on file   Occupational History    Occupation: ret [employer]   Tobacco Use    Smoking status: Former Smoker  Smokeless tobacco: Former User   Substance and Sexual Activity    Alcohol use: Yes     Comment: occasionally    Drug use: No    Sexual activity: Not on file   Other Topics Concern    Not on file   Social History Narrative    Not on file     Current Outpatient Medications   Medication Sig    amLODIPine (NORVASC) 10 mg tablet Take 1 Tab by mouth daily.  lansoprazole (PREVACID) 30 mg capsule Take 1 Cap by mouth Daily (before breakfast). No current facility-administered medications for this visit. Allergies   Allergen Reactions    Aleve [Naproxen Sodium] Rash     REVIEW OF SYSTEMS: colo 9/10 Dr Halima Bautista - no vision change or eye pain  Oral - no mouth pain, tongue or tooth problems  Ears - no hearing loss, ear pain, fullness, no swallowing problems  Cardiac - no CP, PND, orthopnea, edema, palpitations or syncope  Chest - no breast masses  Resp - no wheezing, chronic coughing, dyspnea  Genitals - no genital lesions, discharge, masses, ulceration, warts  Ortho - no swelling, dec ROM, myalgias  Derm - no nail abnormalities, rashes, lesions of note, hair loss    Visit Vitals  BP (!) 146/100   Pulse 67   Temp 98.6 °F (37 °C) (Oral)   Resp 14   Ht 5' 10.25\" (1.784 m)   Wt 204 lb (92.5 kg)   SpO2 95%   BMI 29.06 kg/m²   Affect is appropriate. Mood stable  No apparent distress  HEENT --Anicteric sclerae, No thyromegaly, JVD, or bruits. Lungs --Clear to auscultation, normal percussion. Heart --Regular rate and rhythm, no murmurs, rubs, gallops, or clicks. Chest wall --Nontender to palpation. PMI normal.  Abdomen -- Soft and nontender, no hepatosplenomegaly or masses.    Reducible umbilical hernia, also ventral hernia  Ext without c/c/e    LABS  From 7/10 showed   gluc 83, cr 0.90, gfr>60, alt 63,        chol 156, tg 67,   hdl 46, ldl-c 97,   wbc 5.3, hb 16.1, plt 198, tsh 1.67, psa 0.60  From 7/14 showed   gluc 88, cr 1.04, gfr>60, alt 57, hba1c 5.3, chol 187, tg 194, hdl 43, ldl-c 105, wbc 7.1, hb 17.6, plt 207, tsh 1.80, psa 0.90, ua neg  From 8/14 showed                 alt 71,                                 ast 34, ap 83, tb 0.6, hep b/c neg, bhavin neg, fe 101, %sat 31, ferritin 241, cerulo 17.5  From 2/15 showed   gluc 96, cr 1.02, gfr>60, alt 37  From 10/15 showed gluc 92, cr 0.95, gfr>60, alt 27,        chol 185, tg 104, hdl 44, ldl-c 120  From 10/16 showed gluc 92, cr 1.07, gfr>60, alt 62,        chol 194, tg 47,   hdl 59, ldl-c 126, wbc 5.4, hb 16.7, plt 185,            psa 0.80, ua neg  From 10/17 showed gluc 94, cr 0.99, gfr>60, alt 87,        chol 188, tg 91,   hdl 53, ldl-c 117, wbc 6.6, hb 16.9, plt 257,            psa 1.40, ast 30, ap 94, tb 0.9  From 11/18 showed gluc 96, cr 1.03, gfr>60, alt 74,        chol 193, tg 81,   hdl 46, ldl-c 131, wbc 6.0, hb 16.5, plt 207    Results for orders placed or performed during the hospital encounter of 26/51/62   METABOLIC PANEL, BASIC   Result Value Ref Range    Sodium 140 136 - 145 mmol/L    Potassium 4.3 3.5 - 5.5 mmol/L    Chloride 104 100 - 108 mmol/L    CO2 28 21 - 32 mmol/L    Anion gap 8 3.0 - 18 mmol/L    Glucose 90 74 - 99 mg/dL    BUN 24 (H) 7.0 - 18 MG/DL    Creatinine 1.02 0.6 - 1.3 MG/DL    BUN/Creatinine ratio 24 (H) 12 - 20      GFR est AA >60 >60 ml/min/1.73m2    GFR est non-AA >60 >60 ml/min/1.73m2    Calcium 9.1 8.5 - 10.1 MG/DL   PSA, DIAGNOSTIC (PROSTATE SPECIFIC AG)   Result Value Ref Range    Prostate Specific Ag 1.0 0.0 - 4.0 ng/mL   URINALYSIS W/ RFLX MICROSCOPIC   Result Value Ref Range    Color YELLOW      Appearance CLEAR      Specific gravity 1.020 1.005 - 1.030      pH (UA) 5.5 5.0 - 8.0      Protein NEGATIVE  NEG mg/dL    Glucose NEGATIVE  NEG mg/dL    Ketone 40 (A) NEG mg/dL    Bilirubin NEGATIVE  NEG      Blood NEGATIVE  NEG      Urobilinogen 0.2 0.2 - 1.0 EU/dL    Nitrites NEGATIVE  NEG      Leukocyte Esterase NEGATIVE  NEG     LIPID PANEL   Result Value Ref Range    LIPID PROFILE          Cholesterol, total 200 (H) <200 MG/DL    Triglyceride 193 (H) <150 MG/DL    HDL Cholesterol 43 40 - 60 MG/DL    LDL, calculated 118.4 (H) 0 - 100 MG/DL    VLDL, calculated 38.6 MG/DL    CHOL/HDL Ratio 4.7 0 - 5.0       Calculated 10 year risk score was 13%    Patient Active Problem List   Diagnosis Code    Dyslipidemia E78.5    Colon polyp Dr Wesley Bailey 2010 K63.5    Gastroesophageal reflux disease without esophagitis K21.9    Fatty liver K76.0    BPH with obstruction/lower urinary tract symptoms N40.1, N13.8    Primary hypertension I10    Erectile dysfunction N52.9     Assessment and plan:  1. GERD. Continue ppi and avoidance measures  2. Dyslipidemia. Dietary and lifestyle measures for now, declined meds, but he is willing to go for calcium score for further risk stratification   3. BPH/LUTS. Observation  4. Fatty liver. Wt loss, inc exercise  5. Polyp. Fiber, colo 2020  6. HTN. he might be bringing too much sodium with the bone broth. Will increase amlodipine to 10 mg, call in readings  7. Overweight. IF and keto as he is doing, follow electrolytes and lipid panel closely. 8. ED. He wants to hold off on any meds for now      RTC 9/19    Above conditions discussed at length and patient vocalized understanding.   All questions answered to patient satisfaction

## 2019-03-19 ENCOUNTER — OFFICE VISIT (OUTPATIENT)
Dept: INTERNAL MEDICINE CLINIC | Age: 59
End: 2019-03-19

## 2019-03-19 VITALS
HEART RATE: 67 BPM | OXYGEN SATURATION: 95 % | HEIGHT: 70 IN | RESPIRATION RATE: 14 BRPM | DIASTOLIC BLOOD PRESSURE: 100 MMHG | WEIGHT: 204 LBS | TEMPERATURE: 98.6 F | SYSTOLIC BLOOD PRESSURE: 146 MMHG | BODY MASS INDEX: 29.2 KG/M2

## 2019-03-19 DIAGNOSIS — N52.9 ERECTILE DYSFUNCTION, UNSPECIFIED ERECTILE DYSFUNCTION TYPE: ICD-10-CM

## 2019-03-19 DIAGNOSIS — K76.0 FATTY LIVER: ICD-10-CM

## 2019-03-19 DIAGNOSIS — K63.5 HYPERPLASTIC COLONIC POLYP, UNSPECIFIED PART OF COLON: ICD-10-CM

## 2019-03-19 DIAGNOSIS — I10 PRIMARY HYPERTENSION: Primary | ICD-10-CM

## 2019-03-19 DIAGNOSIS — N40.1 BPH WITH OBSTRUCTION/LOWER URINARY TRACT SYMPTOMS: ICD-10-CM

## 2019-03-19 DIAGNOSIS — E78.5 DYSLIPIDEMIA: ICD-10-CM

## 2019-03-19 DIAGNOSIS — K21.9 GASTROESOPHAGEAL REFLUX DISEASE WITHOUT ESOPHAGITIS: ICD-10-CM

## 2019-03-19 DIAGNOSIS — N13.8 BPH WITH OBSTRUCTION/LOWER URINARY TRACT SYMPTOMS: ICD-10-CM

## 2019-03-19 RX ORDER — AMLODIPINE BESYLATE 10 MG/1
10 TABLET ORAL DAILY
Qty: 90 TAB | Refills: 3 | Status: SHIPPED | OUTPATIENT
Start: 2019-03-19 | End: 2020-03-30

## 2019-03-19 NOTE — PROGRESS NOTES
Chief Complaint   Patient presents with    Cholesterol Problem     4 month follow up with labs         1. Have you been to the ER, urgent care clinic or hospitalized since your last visit? NO.     2. Have you seen or consulted any other health care providers outside of the [address] [place] [address] since your last visit (Include any pap smears or colon screening)?  NO

## 2019-03-25 ENCOUNTER — HOSPITAL ENCOUNTER (OUTPATIENT)
Dept: CT IMAGING | Age: 59
Discharge: HOME | End: 2019-03-25
Attending: INTERNAL MEDICINE
Payer: SELF-PAY

## 2019-03-25 DIAGNOSIS — E78.5 DYSLIPIDEMIA: ICD-10-CM

## 2019-03-25 PROCEDURE — 75571 CT HRT W/O DYE W/CA TEST: CPT

## 2019-03-29 ENCOUNTER — TELEPHONE (OUTPATIENT)
Dept: INTERNAL MEDICINE CLINIC | Age: 59
End: 2019-03-29

## 2019-03-29 DIAGNOSIS — R91.1 PULMONARY NODULE: Primary | ICD-10-CM

## 2019-03-29 DIAGNOSIS — E78.5 DYSLIPIDEMIA: ICD-10-CM

## 2019-03-29 PROBLEM — R93.1 AGATSTON CAC SCORE 100-199: Status: ACTIVE | Noted: 2019-03-29

## 2019-03-29 RX ORDER — ATORVASTATIN CALCIUM 10 MG/1
10 TABLET, FILM COATED ORAL DAILY
Qty: 30 TAB | Refills: 5 | Status: SHIPPED | OUTPATIENT
Start: 2019-03-29 | End: 2019-09-24 | Stop reason: SDUPTHER

## 2019-03-29 NOTE — TELEPHONE ENCOUNTER
pls call    Ca score 181 - left main and LAD have calcification/plaque  Only suggestion is start statin  If amenable, start lipitor 10 every day  F/u 4 mos for labs and ov    Also, noé scan shows benign ltooking nodule; however, not the whole lung was imaged; suggest full CT thorax to get best picure  CT thorax scheduled

## 2019-04-03 ENCOUNTER — HOSPITAL ENCOUNTER (OUTPATIENT)
Dept: CT IMAGING | Age: 59
Discharge: HOME | End: 2019-04-03
Attending: INTERNAL MEDICINE
Payer: COMMERCIAL

## 2019-04-03 DIAGNOSIS — R91.1 PULMONARY NODULE: ICD-10-CM

## 2019-04-03 PROCEDURE — 71250 CT THORAX DX C-: CPT

## 2019-04-07 ENCOUNTER — TELEPHONE (OUTPATIENT)
Dept: INTERNAL MEDICINE CLINIC | Age: 59
End: 2019-04-07

## 2019-04-07 NOTE — TELEPHONE ENCOUNTER
pls call    IMPRESSION:  1. Stable left lower lobe nodule, with calcification pattern consistent with  benign granuloma. No follow-up required. 2. 2 tiny potential nodules versus scar/vessel artifact do not require further  follow-up per current Fleischner guidelines. 3. Stable top normal ascending aortic size. 4. Stable small hiatal hernia. 5. Diverticulosis.     No need for f/u CT

## 2019-04-08 NOTE — TELEPHONE ENCOUNTER
Chief Complaint   Patient presents with    Results     done 04-03-19 CT Chest Without Contrast per Dr Leyva Memory     04-08-19 Patient reached and 2 identifiers were used: Full Name, and Date of Birth verified. The results were given to the patient, and all questions answered. The patient understands all.

## 2019-08-08 RX ORDER — LANSOPRAZOLE 30 MG/1
CAPSULE, DELAYED RELEASE ORAL
Qty: 90 CAP | Refills: 3 | Status: SHIPPED | OUTPATIENT
Start: 2019-08-08 | End: 2020-08-06 | Stop reason: SDUPTHER

## 2019-11-05 ENCOUNTER — DOCUMENTATION ONLY (OUTPATIENT)
Dept: INTERNAL MEDICINE CLINIC | Age: 59
End: 2019-11-05

## 2019-11-11 ENCOUNTER — HOSPITAL ENCOUNTER (OUTPATIENT)
Dept: LAB | Age: 59
Discharge: HOME | End: 2019-11-11
Payer: COMMERCIAL

## 2019-11-11 ENCOUNTER — APPOINTMENT (OUTPATIENT)
Dept: INTERNAL MEDICINE CLINIC | Age: 59
End: 2019-11-11

## 2019-11-11 DIAGNOSIS — E78.5 DYSLIPIDEMIA: ICD-10-CM

## 2019-11-11 LAB
ALBUMIN SERPL-MCNC: 4.4 G/DL (ref 3.4–5)
ALBUMIN/GLOB SERPL: 1.4 {RATIO} (ref 0.8–1.7)
ALP SERPL-CCNC: 86 U/L (ref 45–117)
ALT SERPL-CCNC: 110 U/L (ref 16–61)
ANION GAP SERPL CALC-SCNC: 4 MMOL/L (ref 3–18)
AST SERPL-CCNC: 40 U/L (ref 10–38)
BILIRUB SERPL-MCNC: 0.9 MG/DL (ref 0.2–1)
BUN SERPL-MCNC: 15 MG/DL (ref 7–18)
BUN/CREAT SERPL: 16 (ref 12–20)
CALCIUM SERPL-MCNC: 9.6 MG/DL (ref 8.5–10.1)
CHLORIDE SERPL-SCNC: 108 MMOL/L (ref 100–111)
CO2 SERPL-SCNC: 29 MMOL/L (ref 21–32)
CREAT SERPL-MCNC: 0.96 MG/DL (ref 0.6–1.3)
GLOBULIN SER CALC-MCNC: 3.1 G/DL (ref 2–4)
GLUCOSE SERPL-MCNC: 99 MG/DL (ref 74–99)
POTASSIUM SERPL-SCNC: 4.1 MMOL/L (ref 3.5–5.5)
PROT SERPL-MCNC: 7.5 G/DL (ref 6.4–8.2)
SODIUM SERPL-SCNC: 141 MMOL/L (ref 136–145)

## 2019-11-11 PROCEDURE — 36415 COLL VENOUS BLD VENIPUNCTURE: CPT

## 2019-11-11 PROCEDURE — 80053 COMPREHEN METABOLIC PANEL: CPT

## 2019-11-11 PROCEDURE — 80061 LIPID PANEL: CPT

## 2019-11-12 LAB
CHOLEST SERPL-MCNC: 136 MG/DL
HDLC SERPL-MCNC: 54 MG/DL (ref 40–60)
HDLC SERPL: 2.5 {RATIO} (ref 0–5)
LDLC SERPL CALC-MCNC: 66.2 MG/DL (ref 0–100)
LIPID PROFILE,FLP: NORMAL
TRIGL SERPL-MCNC: 79 MG/DL (ref ?–150)
VLDLC SERPL CALC-MCNC: 15.8 MG/DL

## 2019-11-19 NOTE — PROGRESS NOTES
62 y.o. white male who presents for RPE    Reports no cardiovascular complaints. bp has been controlled on amlodipine. He has not been able to go to the [facility] due to work schedule    No problems on the statin for high ca score    Denies gi or gu complaints     He and his wife did keto and fasting through the summer but tapered off once the school year started    SPECIALTY [facility] 11/22/2019 3/19/2019 11/15/2018 11/9/2017 10/28/2016   Weight 199 lb 204 lb 203 lb 208 lb 9.6 oz 198 lb     Past Medical History:   Diagnosis Date    Agatston CAC score 100-199 03/2019    ca score 181; calcifications left main and LAD    BPH with obstruction/lower urinary tract symptoms     I-PSS 13 from 8/14    Calculus of kidney     Colon polyp     Dr Carol Pappas 9/10    Depression     Dyslipidemia     calculated 10 year risk score 7.6% (10/15); 6.4% (10/16); 8.7% (11/17); 13.5% (11/18); 13% (3/19)    Erectile dysfunction 2019    Fatty liver     Us 8/14 negative serologies; Fib-4 was 0.87 from (7/14)    FHx: heart disease     GERD (gastroesophageal reflux disease)     EGD 10/14 Dr Lewis Lehman    Left-sided tinnitus     and hearing loss; saw Dr Wesley Salgado 2010?     Overweight(278.02)     peak weight 207 lbs, bmi 29.8 from 8/14; IF 1/19 start weight 203 lbs; doing keto diet also with wife    Primary hypertension 11/15/2018    Pulmonary nodule 03/2019    1.4 cm calcified; no change, no f/u rec     Umbilical hernia     and ventral hernia     Past Surgical History:   Procedure Laterality Date    HX COLONOSCOPY      Dr Carol Pappas polyp 9/10     Social History     Socioeconomic History    Marital status:      Spouse name: Not on file    Number of children: 3    Years of education: Not on file    Highest education level: Not on file   Occupational History    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 [email or web address]   Social Needs    Financial resource strain: Not on file    Food insecurity:     Worry: Not on file     Inability: Not on file   [employer] needs: Medical: Not on file     Non-medical: Not on file   Tobacco Use    Smoking status: Former Smoker    Smokeless tobacco: Former User   Substance and Sexual Activity    Alcohol use: Yes     Comment: occasionally    Drug use: No    Sexual activity: Not on file   Lifestyle    Physical activity:     Days per week: Not on file     Minutes per session: Not on file    Stress: Not on file   Relationships    Social connections:     Talks on phone: Not on file     Gets together: Not on file     Attends [employer]ism service: Not on file     Active member of club or organization: Not on file     Attends meetings of clubs or organizations: Not on file     Relationship status: Not on file    Intimate partner violence:     Fear of current or ex partner: Not on file     Emotionally abused: Not on file     Physically abused: Not on file     Forced sexual activity: Not on file   Other Topics Concern    Not on file   Social History Narrative    Not on file     Family History   Problem Relation Age of Onset    Cancer Mother 80        gallbadder cancer    Dementia Mother     Heart Disease Father     Hypertension Father      Immunization History   Administered Date(s) Administered    Influenza Vaccine (Quad) PF 11/09/2017, 11/15/2018, 11/22/2019    Tdap 12/09/2014     Current Outpatient Medications   Medication Sig    atorvastatin (LIPITOR) 10 mg tablet take 1 tablet by mouth once daily    lansoprazole (PREVACID) 30 mg capsule take 1 capsule by mouth every morning BEFORE BREAKFAST    amLODIPine (NORVASC) 10 mg tablet Take 1 Tab by mouth daily. No current facility-administered medications for this visit.       Allergies   Allergen Reactions    Aleve [Naproxen Sodium] Rash     REVIEW OF SYSTEMS: colo 9/10 Dr Verónica Welch  Ophtho - no vision change or eye pain  Oral - no mouth pain, tongue or tooth problems  Ears - no hearing loss, ear pain, fullness, no swallowing problems  Cardiac - no CP, PND, orthopnea, edema, palpitations or syncope  Chest - no breast masses  Resp - no wheezing, chronic coughing, dyspnea  Genitals - no genital lesions, discharge, masses, ulceration, warts  Ortho - no swelling, dec ROM, myalgias  Derm - no nail abnormalities, rashes, lesions of note, hair loss    Visit Vitals  /80   Pulse 78   Temp 98 °F (36.7 °C) (Oral)   Resp 14   Ht 5' 10.25\" (1.784 m)   Wt 199 lb (90.3 kg)   SpO2 96%   BMI 28.35 kg/m²   Affect is appropriate. Mood stable  No apparent distress  HEENT --Anicteric sclerae, No thyromegaly, JVD, or bruits. Lungs --Clear to auscultation, normal percussion. Heart --Regular rate and rhythm, no murmurs, rubs, gallops, or clicks. Chest wall --Nontender to palpation. PMI normal.  Abdomen -- Soft and nontender, no hepatosplenomegaly or masses.    Reducible umbilical hernia, also ventral hernia  Rectal showed guaiac neg brown stool normal tone  Prostate without asymmetry, nodularity, tenderness  Ext without c/c/e    LABS  From 7/10 showed   gluc 83, cr 0.90, gfr>60, alt 63,        chol 156, tg 67,   hdl 46, ldl-c 97,   wbc 5.3, hb 16.1, plt 198, tsh 1.67, psa 0.60  From 7/14 showed   gluc 88, cr 1.04, gfr>60, alt 57, hba1c 5.3, chol 187, tg 194, hdl 43, ldl-c 105, wbc 7.1, hb 17.6, plt 207, tsh 1.80, psa 0.90, ua neg  From 8/14 showed                 alt 71,                                 ast 34, ap 83, tb 0.6, hep b/c neg, bhavin neg, fe 101, %sat 31, ferritin 241, cerulo 17.5  From 2/15 showed   gluc 96, cr 1.02, gfr>60, alt 37  From 10/15 showed gluc 92, cr 0.95, gfr>60, alt 27,        chol 185, tg 104, hdl 44, ldl-c 120  From 10/16 showed gluc 92, cr 1.07, gfr>60, alt 62,        chol 194, tg 47,   hdl 59, ldl-c 126, wbc 5.4, hb 16.7, plt 185,            psa 0.80, ua neg  From 10/17 showed gluc 94, cr 0.99, gfr>60, alt 87,        chol 188, tg 91,   hdl 53, ldl-c 117, wbc 6.6, hb 16.9, plt 257,            psa 1.40, ast 30, ap 94, tb 0.9  From 11/18 showed gluc 96, cr 1.03, gfr>60, alt 74,        chol 193, tg 81,   hdl 46, ldl-c 131, wbc 6.0, hb 16.5, plt 207  From 3/19 showed   gluc 90, cr 1.02, gfr>60,         chol 200, tg 193, hdl 43, ldl-c 118,                          psa 1.0, ua neg    Results for orders placed or performed during the hospital encounter of 16/33/94   METABOLIC PANEL, COMPREHENSIVE   Result Value Ref Range    Sodium 141 136 - 145 mmol/L    Potassium 4.1 3.5 - 5.5 mmol/L    Chloride 108 100 - 111 mmol/L    CO2 29 21 - 32 mmol/L    Anion gap 4 3.0 - 18 mmol/L    Glucose 99 74 - 99 mg/dL    BUN 15 7.0 - 18 MG/DL    Creatinine 0.96 0.6 - 1.3 MG/DL    BUN/Creatinine ratio 16 12 - 20      GFR est AA >60 >60 ml/min/1.73m2    GFR est non-AA >60 >60 ml/min/1.73m2    Calcium 9.6 8.5 - 10.1 MG/DL    Bilirubin, total 0.9 0.2 - 1.0 MG/DL    ALT (SGPT) 110 (H) 16 - 61 U/L    AST (SGOT) 40 (H) 10 - 38 U/L    Alk. phosphatase 86 45 - 117 U/L    Protein, total 7.5 6.4 - 8.2 g/dL    Albumin 4.4 3.4 - 5.0 g/dL    Globulin 3.1 2.0 - 4.0 g/dL    A-G Ratio 1.4 0.8 - 1.7     LIPID PANEL   Result Value Ref Range    LIPID PROFILE          Cholesterol, total 136 <200 MG/DL    Triglyceride 79 <150 MG/DL    HDL Cholesterol 54 40 - 60 MG/DL    LDL, calculated 66.2 0 - 100 MG/DL    VLDL, calculated 15.8 MG/DL    CHOL/HDL Ratio 2.5 0 - 5.0       Patient Active Problem List   Diagnosis Code    Dyslipidemia E78.5    Colon polyp Dr Jason Rob 2010 K63.5    Gastroesophageal reflux disease without esophagitis K21.9    Fatty liver K76.0    BPH with obstruction/lower urinary tract symptoms N40.1, N13.8    Primary hypertension I10    Erectile dysfunction N52.9    Agatston CAC score 100-199 R93.1     Assessment and plan:  1. GERD. Continue ppi and avoidance measures  2. Dyslipidemia. Continue statin  3. BPH/LUTS. Observation  4. Fatty liver. With rising lfts, will ask for opinion; check serologies  5. Polyp. Fiber, colo 2020  6. HTN. Continue amlo  7. Overweight.  Lifestyle and dietary measures        RTC 5/20    Above conditions discussed at length and patient vocalized understanding.   All questions answered to patient satisfaction

## 2019-11-22 ENCOUNTER — OFFICE VISIT (OUTPATIENT)
Dept: INTERNAL MEDICINE CLINIC | Age: 59
End: 2019-11-22

## 2019-11-22 VITALS
WEIGHT: 199 LBS | OXYGEN SATURATION: 96 % | SYSTOLIC BLOOD PRESSURE: 128 MMHG | TEMPERATURE: 98 F | BODY MASS INDEX: 28.49 KG/M2 | DIASTOLIC BLOOD PRESSURE: 80 MMHG | HEIGHT: 70 IN | RESPIRATION RATE: 14 BRPM | HEART RATE: 78 BPM

## 2019-11-22 DIAGNOSIS — Z00.00 PHYSICAL EXAM: Primary | ICD-10-CM

## 2019-11-22 DIAGNOSIS — R74.01 TRANSAMINASEMIA: ICD-10-CM

## 2019-11-22 DIAGNOSIS — N13.8 BPH WITH OBSTRUCTION/LOWER URINARY TRACT SYMPTOMS: ICD-10-CM

## 2019-11-22 DIAGNOSIS — K63.5 HYPERPLASTIC COLONIC POLYP, UNSPECIFIED PART OF COLON: ICD-10-CM

## 2019-11-22 DIAGNOSIS — I10 PRIMARY HYPERTENSION: ICD-10-CM

## 2019-11-22 DIAGNOSIS — K76.0 FATTY LIVER: ICD-10-CM

## 2019-11-22 DIAGNOSIS — E78.5 DYSLIPIDEMIA: ICD-10-CM

## 2019-11-22 DIAGNOSIS — Z23 ENCOUNTER FOR IMMUNIZATION: ICD-10-CM

## 2019-11-22 DIAGNOSIS — Z12.5 SCREENING FOR PROSTATE CANCER: ICD-10-CM

## 2019-11-22 DIAGNOSIS — K21.9 GASTROESOPHAGEAL REFLUX DISEASE WITHOUT ESOPHAGITIS: ICD-10-CM

## 2019-11-22 DIAGNOSIS — R93.1 AGATSTON CAC SCORE 100-199: ICD-10-CM

## 2019-11-22 DIAGNOSIS — N40.1 BPH WITH OBSTRUCTION/LOWER URINARY TRACT SYMPTOMS: ICD-10-CM

## 2019-11-22 LAB
HEMOCCULT STL QL: NEGATIVE
VALID INTERNAL CONTROL?: YES

## 2019-11-22 NOTE — PROGRESS NOTES
Molly Montana presents today for   Chief Complaint   Patient presents with    Physical     labs              Depression Screening:  3 most recent PHQ Screens 3/19/2019   Little interest or pleasure in doing things Not at all   Feeling down, depressed, irritable, or hopeless More than half the days   Total Score PHQ 2 2       Learning Assessment:  Learning Assessment 8/4/2014   PRIMARY LEARNER Patient   HIGHEST LEVEL OF EDUCATION - PRIMARY LEARNER  GRADUATED HIGH SCHOOL OR GED   BARRIERS PRIMARY LEARNER NONE   CO-LEARNER CAREGIVER No   PRIMARY LANGUAGE ENGLISH   LEARNER PREFERENCE PRIMARY VIDEOS   ANSWERED BY patient   RELATIONSHIP SELF       Abuse Screening:  Abuse Screening Questionnaire 10/26/2015   Do you ever feel afraid of your partner? N   Are you in a relationship with someone who physically or mentally threatens you? N   Is it safe for you to go home? Y       Fall Risk  No flowsheet data found. Coordination of Care:  1. Have you been to the ER, urgent care clinic since your last visit? Hospitalized since your last visit? no    2. Have you seen or consulted any other health care providers outside of the [address] [place] [address] since your last visit? Include any pap smears or colon screening. No    Molly Montana is a 62 y.o. male who presents for routine immunizations. Per verbal order from [address] for influenza (left deltoid)  He denies any symptoms , reactions or allergies that would exclude them from being immunized today. Risks and adverse reactions were discussed and the VIS was given to them. All questions were addressed. He was observed for 15 min post injection. There were no reactions observed.     Rachelle Darnell LPN

## 2019-11-22 NOTE — PATIENT INSTRUCTIONS
Vaccine Information Statement    Influenza (Flu) Vaccine (Inactivated or Recombinant): What You Need to Know    Many Vaccine Information Statements are available in Turkish and other languages. See www.immunize.org/vis  Hojas de información sobre vacunas están disponibles en español y en muchos otros idiomas. Visite www.immunize.org/vis    1. Why get vaccinated? Influenza vaccine can prevent influenza (flu). Flu is a contagious disease that spreads around the [facility] every year, usually between October and May. Anyone can get the flu, but it is more dangerous for some people. Infants and young children, people 72years of age and older, pregnant women, and people with certain health conditions or a weakened immune system are at greatest risk of flu complications. Pneumonia, bronchitis, sinus infections and ear infections are examples of flu-related complications. If you have a medical condition, such as heart disease, cancer or diabetes, flu can make it worse. Flu can cause fever and chills, sore throat, muscle aches, fatigue, cough, headache, and runny or stuffy nose. Some people may have vomiting and diarrhea, though this is more common in children than adults. Each year thousands of people in the [facility] die from flu, and many more are hospitalized. Flu vaccine prevents millions of illnesses and flu-related visits to the doctor each year. 2. Influenza vaccines     CDC recommends everyone 10months of age and older get vaccinated every flu season. Children 6 months through 6years of age may need 2 doses during a single flu season. Everyone else needs only 1 dose each flu season. It takes about 2 weeks for protection to develop after vaccination. There are many flu viruses, and they are always changing. Each year a new flu vaccine is made to protect against three or four viruses that are likely to cause disease in the upcoming flu season.  Even when the vaccine doesnt exactly match these viruses, it may still provide some protection. Influenza vaccine does not cause flu. Influenza vaccine may be given at the same time as other vaccines. 3. Talk with your health care provider    Tell your vaccine provider if the person getting the vaccine:   Has had an allergic reaction after a previous dose of influenza vaccine, or has any severe, life-threatening allergies.  Has ever had Guillain-Barré Syndrome (also called GBS). In some cases, your health care provider may decide to postpone influenza vaccination to a future visit. People with minor illnesses, such as a cold, may be vaccinated. People who are moderately or severely ill should usually wait until they recover before getting influenza vaccine. Your health care provider can give you more information. 4. Risks of a reaction     Soreness, redness, and swelling where shot is given, fever, muscle aches, and headache can happen after influenza vaccine.  There may be a very small increased risk of Guillain-Barré Syndrome (GBS) after inactivated influenza vaccine (the flu shot). Timbo Olsen children who get the flu shot along with pneumococcal vaccine (PCV13), and/or DTaP vaccine at the same time might be slightly more likely to have a seizure caused by fever. Tell your health care provider if a child who is getting flu vaccine has ever had a seizure. People sometimes faint after medical procedures, including vaccination. Tell your provider if you feel dizzy or have vision changes or ringing in the ears. As with any medicine, there is a very remote chance of a vaccine causing a severe allergic reaction, other serious injury, or death. 5. What if there is a serious problem? An allergic reaction could occur after the vaccinated person leaves the clinic.  If you see signs of a severe allergic reaction (hives, swelling of the face and throat, difficulty breathing, a fast heartbeat, dizziness, or weakness), call 9-1-1 and get the person to the nearest hospital.    For other signs that concern you, call your health care provider. Adverse reactions should be reported to the Vaccine Adverse Event Reporting System (VAERS). Your health care provider will usually file this report, or you can do it yourself. Visit the VAERS website at www.vaers. [facility].gov or call 2-291.909.5932. VAERS is only for reporting reactions, and VAERS staff do not give medical advice. 6. The National Vaccine Injury Compensation Program    The [facility] Vaccine Injury Compensation Program (VICP) is a federal program that was created to compensate people who may have been injured by certain vaccines. Visit the VICP website at www.[facility].gov/vaccinecompensation or call 2-789.338.9072 to learn about the program and about filing a claim. There is a time limit to file a claim for compensation. 7. How can I learn more?  Ask your health care provider.  Call your local or state health department.  Contact the Centers for Disease Control and Prevention (CDC):  - Call 1-836.718.8745 (8-606-UWC-INFO) or  - Visit CDCs influenza website at www.cdc.gov/flu    Vaccine Information Statement (Interim)  Inactivated Influenza Vaccine   8/15/2019  [address] MARIA[address]SE Ramirez [id number]   Department of Health and Human Services  Centers for Disease Control and Prevention    Office Use Only

## 2020-01-02 ENCOUNTER — HOSPITAL ENCOUNTER (OUTPATIENT)
Dept: ULTRASOUND IMAGING | Age: 60
Discharge: HOME | End: 2020-01-02
Attending: INTERNAL MEDICINE
Payer: COMMERCIAL

## 2020-01-02 DIAGNOSIS — R13.10 DYSPHAGIA, UNSPECIFIED: ICD-10-CM

## 2020-01-02 DIAGNOSIS — Z86.010 PERSONAL HISTORY OF COLONIC POLYPS: ICD-10-CM

## 2020-01-02 DIAGNOSIS — R74.8 ABNORMAL LIVER ENZYMES: ICD-10-CM

## 2020-01-02 DIAGNOSIS — K21.9 GASTROESOPHAGEAL REFLUX DISEASE: ICD-10-CM

## 2020-01-02 PROCEDURE — 76705 ECHO EXAM OF ABDOMEN: CPT

## 2020-03-30 DIAGNOSIS — I10 PRIMARY HYPERTENSION: ICD-10-CM

## 2020-03-30 RX ORDER — AMLODIPINE BESYLATE 10 MG/1
TABLET ORAL
Qty: 90 TAB | Refills: 3 | Status: SHIPPED | OUTPATIENT
Start: 2020-03-30 | End: 2021-03-30

## 2020-07-30 ENCOUNTER — HOSPITAL ENCOUNTER (OUTPATIENT)
Dept: LAB | Age: 60
Discharge: HOME | End: 2020-07-30
Payer: COMMERCIAL

## 2020-07-30 ENCOUNTER — APPOINTMENT (OUTPATIENT)
Dept: INTERNAL MEDICINE CLINIC | Age: 60
End: 2020-07-30

## 2020-07-30 DIAGNOSIS — R74.01 TRANSAMINASEMIA: ICD-10-CM

## 2020-07-30 DIAGNOSIS — E78.5 DYSLIPIDEMIA: ICD-10-CM

## 2020-07-30 DIAGNOSIS — Z12.5 SCREENING FOR PROSTATE CANCER: ICD-10-CM

## 2020-07-30 LAB
ALBUMIN SERPL-MCNC: 4.4 G/DL (ref 3.4–5)
ALBUMIN/GLOB SERPL: 1.3 {RATIO} (ref 0.8–1.7)
ALP SERPL-CCNC: 75 U/L (ref 45–117)
ALT SERPL-CCNC: 88 U/L (ref 16–61)
ANION GAP SERPL CALC-SCNC: 6 MMOL/L (ref 3–18)
AST SERPL-CCNC: 37 U/L (ref 10–38)
BILIRUB SERPL-MCNC: 1 MG/DL (ref 0.2–1)
BUN SERPL-MCNC: 15 MG/DL (ref 7–18)
BUN/CREAT SERPL: 17 (ref 12–20)
CALCIUM SERPL-MCNC: 9 MG/DL (ref 8.5–10.1)
CHLORIDE SERPL-SCNC: 108 MMOL/L (ref 100–111)
CHOLEST SERPL-MCNC: 142 MG/DL
CO2 SERPL-SCNC: 27 MMOL/L (ref 21–32)
CREAT SERPL-MCNC: 0.88 MG/DL (ref 0.6–1.3)
ERYTHROCYTE [DISTWIDTH] IN BLOOD BY AUTOMATED COUNT: 13.1 % (ref 11.6–14.5)
GLOBULIN SER CALC-MCNC: 3.4 G/DL (ref 2–4)
GLUCOSE SERPL-MCNC: 90 MG/DL (ref 74–99)
HCT VFR BLD AUTO: 48.2 % (ref 36–48)
HDLC SERPL-MCNC: 56 MG/DL (ref 40–60)
HDLC SERPL: 2.5 {RATIO} (ref 0–5)
HGB BLD-MCNC: 17.2 G/DL (ref 13–16)
LDLC SERPL CALC-MCNC: 68.2 MG/DL (ref 0–100)
LIPID PROFILE,FLP: NORMAL
MCH RBC QN AUTO: 33 PG (ref 24–34)
MCHC RBC AUTO-ENTMCNC: 35.7 G/DL (ref 31–37)
MCV RBC AUTO: 92.5 FL (ref 74–97)
PLATELET # BLD AUTO: 234 K/UL (ref 135–420)
PMV BLD AUTO: 10.3 FL (ref 9.2–11.8)
POTASSIUM SERPL-SCNC: 4.3 MMOL/L (ref 3.5–5.5)
PROT SERPL-MCNC: 7.8 G/DL (ref 6.4–8.2)
PSA SERPL-MCNC: 1 NG/ML (ref 0–4)
RBC # BLD AUTO: 5.21 M/UL (ref 4.7–5.5)
SODIUM SERPL-SCNC: 141 MMOL/L (ref 136–145)
TRIGL SERPL-MCNC: 89 MG/DL (ref ?–150)
VLDLC SERPL CALC-MCNC: 17.8 MG/DL
WBC # BLD AUTO: 7 K/UL (ref 4.6–13.2)

## 2020-07-30 PROCEDURE — 80061 LIPID PANEL: CPT

## 2020-07-30 PROCEDURE — 36415 COLL VENOUS BLD VENIPUNCTURE: CPT

## 2020-07-30 PROCEDURE — 80053 COMPREHEN METABOLIC PANEL: CPT

## 2020-07-30 PROCEDURE — 84153 ASSAY OF PSA TOTAL: CPT

## 2020-07-30 PROCEDURE — 85027 COMPLETE CBC AUTOMATED: CPT

## 2020-08-06 ENCOUNTER — VIRTUAL VISIT (OUTPATIENT)
Dept: INTERNAL MEDICINE CLINIC | Age: 60
End: 2020-08-06

## 2020-08-06 DIAGNOSIS — N13.8 BPH WITH OBSTRUCTION/LOWER URINARY TRACT SYMPTOMS: ICD-10-CM

## 2020-08-06 DIAGNOSIS — I10 PRIMARY HYPERTENSION: Primary | ICD-10-CM

## 2020-08-06 DIAGNOSIS — K76.0 FATTY LIVER: ICD-10-CM

## 2020-08-06 DIAGNOSIS — N40.1 BPH WITH OBSTRUCTION/LOWER URINARY TRACT SYMPTOMS: ICD-10-CM

## 2020-08-06 DIAGNOSIS — R93.1 AGATSTON CAC SCORE 100-199: ICD-10-CM

## 2020-08-06 DIAGNOSIS — K63.5 HYPERPLASTIC COLONIC POLYP, UNSPECIFIED PART OF COLON: ICD-10-CM

## 2020-08-06 DIAGNOSIS — E78.5 DYSLIPIDEMIA: ICD-10-CM

## 2020-08-06 RX ORDER — LANSOPRAZOLE 30 MG/1
CAPSULE, DELAYED RELEASE ORAL
Qty: 90 CAP | Refills: 3 | Status: SHIPPED | OUTPATIENT
Start: 2020-08-06 | End: 2021-07-28

## 2020-08-06 RX ORDER — BENAZEPRIL HYDROCHLORIDE 10 MG/1
10 TABLET ORAL DAILY
Qty: 90 TAB | Refills: 3 | Status: SHIPPED | OUTPATIENT
Start: 2020-08-06 | End: 2020-11-10 | Stop reason: SINTOL

## 2020-08-06 NOTE — PROGRESS NOTES
Jacob Logan is a 56 y.o. male who was seen by synchronous (real-time) audio-video technology on 8/6/2020 for No chief complaint on file. Assessment & Plan:   Diagnoses and all orders for this visit:    1. Primary hypertension  -     benazepriL (LOTENSIN) 10 mg tablet; Take 1 Tab by mouth daily.  -     METABOLIC PANEL, BASIC; Future    2. Agatston CAC score 100-199    3. BPH with obstruction/lower urinary tract symptoms    4. Hyperplastic colonic polyp, unspecified part of colon    5. Dyslipidemia    6. Fatty liver    Other orders  -     lansoprazole (PREVACID) 30 mg capsule; take 1 capsule by mouth every morning BEFORE BREAKFAST        I spent at least 21 minutes on this visit with this established patient. 712  Subjective:     Objective:   No flowsheet data found. General: alert, cooperative, no distress   Mental  status: normal mood, behavior, speech, dress, motor activity, and thought processes, able to follow commands   HENT: NCAT   Neck: no visualized mass   Resp: no respiratory distress   Neuro: no gross deficits   Skin: no discoloration or lesions of concern on visible areas   Psychiatric: normal affect, consistent with stated mood, no evidence of hallucinations     Additional exam findings: We discussed the expected course, resolution and complications of the diagnosis(es) in detail. Medication risks, benefits, costs, interactions, and alternatives were discussed as indicated. I advised him to contact the office if his condition worsens, changes or fails to improve as anticipated. He expressed understanding with the diagnosis(es) and plan. Jacob Logan, who was evaluated through a patient-initiated, synchronous (real-time) audio-video encounter, and/or his healthcare decision maker, is aware that it is a billable service, with coverage as determined by his insurance carrier. He provided verbal consent to proceed: Yes, and patient identification was verified.  It was conducted pursuant to the emergency declaration under the [address] [place], [facility] authority and the [employer] Act. A caregiver was present when appropriate. Ability to conduct physical exam was limited. I was in the office. The patient was at home. Qiana Luong MD    Reports no cardiovascular complaints. They were going to the [facility] until the pandemic started. He has been getting bp readings in the 502-390 range systolic of late although asymptomatic    Denies gi or gu complaints. He was dx'ed w fatty liver by GI and is scheduled for colo later this month apparently    Diet is off, was on keto but not currently, not doing IF and weight is up back to 208 lbs on his scale    Vitals 11/22/2019 3/19/2019 11/15/2018 11/9/2017 10/28/2016   Weight 199 lb 204 lb 203 lb 208 lb 9.6 oz 198 lb     Past Medical History:   Diagnosis Date    Agatston CAC score 100-199 03/2019    ca score 181; calcifications left main and LAD    BPH with obstruction/lower urinary tract symptoms 08/2014    I-PSS 13    Colon polyp 09/2010    Dr Shelli Henriquez     Depression     Dyslipidemia     calculated 10 year risk score 7.6% (10/15); 6.4% (10/16); 8.7% (11/17); 13.5% (11/18); 13% (3/19)    Erectile dysfunction 2019    Fatty liver     Us 8/14 negative serologies; Fib-4 was 0.87 from (7/14)    FHx: heart disease     GERD (gastroesophageal reflux disease)     EGD 10/14 Dr Andrei Joseph    Left-sided tinnitus     and hearing loss; saw Dr Agnes Cortes 2010?     Nephrolithiasis     Overweight(278.02)     peak weight 207 lbs, bmi 29.8 from 8/14; IF 1/19 start weight 203 lbs; doing keto diet also with wife    Primary hypertension 11/15/2018    Pulmonary nodule 03/2019    1.4 cm calcified; no change, no f/u rec     Umbilical hernia     and ventral hernia     Past Surgical History:   Procedure Laterality Date    HX COLONOSCOPY      Dr Shelli Henriquez polyp 9/10     Social History     Socioeconomic History    Marital status:      Spouse name: Not on file    Number of children: 3    Years of education: Not on file    Highest education level: Not on file   Occupational History    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 [employer]   Social Needs    Financial resource strain: Not on file    Food insecurity     Worry: Not on file     Inability: Not on file   [employer] needs     Medical: Not on file     Non-medical: Not on file   Tobacco Use    Smoking status: Former Smoker    Smokeless tobacco: Former User   Substance and Sexual Activity    Alcohol use: Yes     Comment: occasionally    Drug use: No    Sexual activity: Not on file   Lifestyle    Physical activity     Days per week: Not on file     Minutes per session: Not on file    Stress: Not on file   Relationships    Social connections     Talks on phone: Not on file     Gets together: Not on file     Attends [employer] service: Not on file     Active member of club or organization: Not on file     Attends meetings of clubs or organizations: Not on file     Relationship status: Not on file    Intimate partner violence     Fear of current or ex partner: Not on file     Emotionally abused: Not on file     Physically abused: Not on file     Forced sexual activity: Not on file   Other Topics Concern    Not on file   Social History Narrative    Not on file     Family History   Problem Relation Age of Onset    Cancer Mother 80        gallbadder cancer    Dementia Mother     Heart Disease Father     Hypertension Father      Immunization History   Administered Date(s) Administered    Influenza Vaccine (Quad) PF 11/09/2017, 11/15/2018, 11/22/2019    Tdap 12/09/2014     Current Outpatient Medications   Medication Sig    benazepriL (LOTENSIN) 10 mg tablet Take 1 Tab by mouth daily.     lansoprazole (PREVACID) 30 mg capsule take 1 capsule by mouth every morning BEFORE BREAKFAST    amLODIPine (NORVASC) 10 mg tablet take 1 tablet by mouth once daily    atorvastatin (LIPITOR) 10 mg tablet take 1 tablet by mouth once daily     No current facility-administered medications for this visit.       Allergies   Allergen Reactions    Aleve [Naproxen Sodium] Rash     REVIEW OF SYSTEMS: colo 9/10 Dr Van Stack  no vision change or eye pain  Oral  no mouth pain, tongue or tooth problems  Ears  no hearing loss, ear pain, fullness, no swallowing problems  Cardiac  no CP, PND, orthopnea, edema, palpitations or syncope  Chest  no breast masses  Resp  no wheezing, chronic coughing, dyspnea  Genitals  no genital lesions, discharge, masses, ulceration, warts  Ortho  no swelling, dec ROM, myalgias  Derm  no nail abnormalities, rashes, lesions of note, hair loss    LABS  From 7/10 showed   gluc 83, cr 0.90, gfr>60, alt 63,        chol 156, tg 67,   hdl 46, ldl-c 97,   wbc 5.3, hb 16.1, plt 198, tsh 1.67, psa 0.60  From 7/14 showed   gluc 88, cr 1.04, gfr>60, alt 57, hba1c 5.3, chol 187, tg 194, hdl 43, ldl-c 105, wbc 7.1, hb 17.6, plt 207, tsh 1.80, psa 0.90, ua neg  From 8/14 showed                 alt 71,                                 ast 34, ap 83, tb 0.6, hep b/c neg, bhavin neg, fe 101, %sat 31, ferritin 241, cerulo 17.5  From 2/15 showed   gluc 96, cr 1.02, gfr>60, alt 37  From 10/15 showed gluc 92, cr 0.95, gfr>60, alt 27,        chol 185, tg 104, hdl 44, ldl-c 120  From 10/16 showed gluc 92, cr 1.07, gfr>60, alt 62,        chol 194, tg 47,   hdl 59, ldl-c 126, wbc 5.4, hb 16.7, plt 185,            psa 0.80, ua neg  From 10/17 showed gluc 94, cr 0.99, gfr>60, alt 87,        chol 188, tg 91,   hdl 53, ldl-c 117, wbc 6.6, hb 16.9, plt 257,            psa 1.40, ast 30, ap 94, tb 0.9  From 11/18 showed gluc 96, cr 1.03, gfr>60, alt 74,        chol 193, tg 81,   hdl 46, ldl-c 131, wbc 6.0, hb 16.5, plt 207  From 3/19 showed   gluc 90, cr 1.02, gfr>60,         chol 200, tg 193, hdl 43, ldl-c 118,                          psa 1.00, ua neg  From 11/19 showed gluc 99, cr 0.96, gfr>60, alt 110,         chol 136, tg 79,   hdl 54, ldl-c 66,          ast 40, ap 86, tb 0.9,    Results for orders placed or performed during the hospital encounter of 07/30/20   CBC W/O DIFF   Result Value Ref Range    WBC 7.0 4.6 - 13.2 K/uL    RBC 5.21 4.70 - 5.50 M/uL    HGB 17.2 (H) 13.0 - 16.0 g/dL    HCT 48.2 (H) 36.0 - 48.0 %    MCV 92.5 74.0 - 97.0 FL    MCH 33.0 24.0 - 34.0 PG    MCHC 35.7 31.0 - 37.0 g/dL    RDW 13.1 11.6 - 14.5 %    PLATELET 479 395 - 823 K/uL    MPV 10.3 9.2 - 11.8 FL   LIPID PANEL   Result Value Ref Range    LIPID PROFILE          Cholesterol, total 142 <200 MG/DL    Triglyceride 89 <150 MG/DL    HDL Cholesterol 56 40 - 60 MG/DL    LDL, calculated 68.2 0 - 100 MG/DL    VLDL, calculated 17.8 MG/DL    CHOL/HDL Ratio 2.5 0 - 5.0     METABOLIC PANEL, COMPREHENSIVE   Result Value Ref Range    Sodium 141 136 - 145 mmol/L    Potassium 4.3 3.5 - 5.5 mmol/L    Chloride 108 100 - 111 mmol/L    CO2 27 21 - 32 mmol/L    Anion gap 6 3.0 - 18 mmol/L    Glucose 90 74 - 99 mg/dL    BUN 15 7.0 - 18 MG/DL    Creatinine 0.88 0.6 - 1.3 MG/DL    BUN/Creatinine ratio 17 12 - 20      GFR est AA >60 >60 ml/min/1.73m2    GFR est non-AA >60 >60 ml/min/1.73m2    Calcium 9.0 8.5 - 10.1 MG/DL    Bilirubin, total 1.0 0.2 - 1.0 MG/DL    ALT (SGPT) 88 (H) 16 - 61 U/L    AST (SGOT) 37 10 - 38 U/L    Alk.  phosphatase 75 45 - 117 U/L    Protein, total 7.8 6.4 - 8.2 g/dL    Albumin 4.4 3.4 - 5.0 g/dL    Globulin 3.4 2.0 - 4.0 g/dL    A-G Ratio 1.3 0.8 - 1.7     PSA, DIAGNOSTIC (PROSTATE SPECIFIC AG)   Result Value Ref Range    Prostate Specific Ag 1.0 0.0 - 4.0 ng/mL     Patient Active Problem List   Diagnosis Code    Dyslipidemia E78.5    Colon polyp Dr Jay Álvarez 2010 K63.5    Gastroesophageal reflux disease without esophagitis K21.9    Fatty liver K76.0    BPH with obstruction/lower urinary tract symptoms N40.1, N13.8    Primary hypertension I10    Erectile dysfunction N52.9    Agatston CAC score 100-199 R93.1     Assessment and plan:  1. GERD. Continue ppi and avoidance measures  2. Dyslipidemia. Continue statin  3. BPH/LUTS. Observation  4. Fatty liver. Weight loss  5. Polyp. Fiber, colo already scheduled  6. HTN. Continue amlo and add benazepril, split dose morning and evening, sfx discussed. F/u 4 mos  7. Overweight. Lifestyle and dietary measures        RTC 12/20    Above conditions discussed at length and patient vocalized understanding.   All questions answered to patient satisfaction

## 2020-09-17 DIAGNOSIS — E78.5 DYSLIPIDEMIA: ICD-10-CM

## 2020-09-20 RX ORDER — ATORVASTATIN CALCIUM 10 MG/1
TABLET, FILM COATED ORAL
Qty: 90 TAB | Refills: 3 | Status: SHIPPED | OUTPATIENT
Start: 2020-09-20 | End: 2021-09-15

## 2020-11-03 ENCOUNTER — DOCUMENTATION ONLY (OUTPATIENT)
Dept: INTERNAL MEDICINE CLINIC | Age: 60
End: 2020-11-03

## 2020-11-03 NOTE — PROGRESS NOTES
The Prior Authorization request has been approved for Lansoprazole 30MG OR CPDR.   The authorization is valid from 10/04/2020 through 11/03/2021

## 2020-11-09 ENCOUNTER — TELEPHONE (OUTPATIENT)
Dept: INTERNAL MEDICINE CLINIC | Age: 60
End: 2020-11-09

## 2020-11-09 DIAGNOSIS — I10 PRIMARY HYPERTENSION: Primary | ICD-10-CM

## 2020-11-09 NOTE — TELEPHONE ENCOUNTER
Benazepril     Pt says RD told him Benazepril may cause him to cough. Says he has given it several months now and the cough is getting worse. Actually seems to bring up a fluid. Wants to know if RD can change the medication?

## 2020-11-10 RX ORDER — LOSARTAN POTASSIUM 25 MG/1
25 TABLET ORAL DAILY
Qty: 30 TAB | Refills: 5 | Status: SHIPPED | OUTPATIENT
Start: 2020-11-10 | End: 2020-12-08 | Stop reason: DRUGHIGH

## 2020-12-01 NOTE — PROGRESS NOTES
56 y.o. WHITE OR  male who presents for evaluation. Reports no cardiovascular complaints. We added benazepril after the last visit but he had onset of cough so changed overt o losartan. Doing better at controlling the readings but still getting 130-140s over 80s. No sfx to report. Denies gi or gu complaints. Diet remains off as he came off keto and IF    Vitals 12/8/2020 11/22/2019 3/19/2019   Weight 212 lb 199 lb 204 lb     Past Medical History:   Diagnosis Date    Agatston CAC score 100-199 03/2019    ca score 181; calcifications left main and LAD    BPH with obstruction/lower urinary tract symptoms 08/2014    I-PSS 13    Colon adenomas 08/13/2020    Dr Kizzy Cuello 2 adenomas    Colon polyp 09/2010    Dr Daniel Alvarenga Depression     Dyslipidemia     calculated 10 year risk score 7.6% (10/15); 6.4% (10/16); 8.7% (11/17); 13.5% (11/18); 13% (3/19)    Erectile dysfunction 2019    Fatty liver     Us 8/14 negative serologies; Fib-4 was 0.87 from (7/14)    FHx: heart disease     GERD (gastroesophageal reflux disease)     EGD 10/14 Dr Thom Bustamante    Left-sided tinnitus     and hearing loss; saw Dr Clive Garcia 2010?     Nephrolithiasis     Overweight(278.02)     peak weight 207 lbs, bmi 29.8 from 8/14; IF 1/19 start weight 203 lbs; doing keto diet also with wife    Primary hypertension 11/15/2018    Pulmonary nodule 03/2019    1.4 cm calcified; no change, no f/u rec     Umbilical hernia     and ventral hernia     Past Surgical History:   Procedure Laterality Date    HX COLONOSCOPY      Dr Gissel Johnson polyp 9/10; Dr Kizzy Cuello 8/13/20 divertics and adenomas     Social History     Socioeconomic History    Marital status:      Spouse name: Not on file    Number of children: 3    Years of education: Not on file    Highest education level: Not on file   Occupational History    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 [employer]   Social Needs    Financial resource strain: Not on file    Food insecurity     Worry: Not on file     Inability: Not on file    Transportation needs     Medical: Not on file     Non-medical: Not on file   Tobacco Use    Smoking status: Former Smoker    Smokeless tobacco: Former User   Substance and Sexual Activity    Alcohol use: Yes     Comment: occasionally    Drug use: No    Sexual activity: Not on file   Lifestyle    Physical activity     Days per week: Not on file     Minutes per session: Not on file    Stress: Not on file   Relationships    Social connections     Talks on phone: Not on file     Gets together: Not on file     Attends [employer] service: Not on file     Active member of club or organization: Not on file     Attends meetings of clubs or organizations: Not on file     Relationship status: Not on file    Intimate partner violence     Fear of current or ex partner: Not on file     Emotionally abused: Not on file     Physically abused: Not on file     Forced sexual activity: Not on file   Other Topics Concern    Not on file   Social History Narrative    Not on file     Family History   Problem Relation Age of Onset    Cancer Mother 80        gallbadder cancer    Dementia Mother     Heart Disease Father     Hypertension Father      Immunization History   Administered Date(s) Administered    Influenza Vaccine (Quad) PF (>6 Mo Flulaval, Fluarix, and >3 Yrs [address] [place] [address]) 11/09/2017, 11/15/2018, 11/22/2019, 12/08/2020    Tdap 12/09/2014     Current Outpatient Medications   Medication Sig    losartan (COZAAR) 50 mg tablet Take 1 Tab by mouth daily.  atorvastatin (LIPITOR) 10 mg tablet take 1 tablet by mouth once daily    lansoprazole (PREVACID) 30 mg capsule take 1 capsule by mouth every morning BEFORE BREAKFAST    amLODIPine (NORVASC) 10 mg tablet take 1 tablet by mouth once daily     No current facility-administered medications for this visit.       Allergies   Allergen Reactions    Aleve [Naproxen Sodium] Rash    Benazepril Cough     REVIEW OF SYSTEMS: colo 8/20 Dr Kris Reddy  Ophtho - no vision change or eye pain  Oral - no mouth pain, tongue or tooth problems  Ears - no hearing loss, ear pain, fullness, no swallowing problems  Cardiac - no CP, PND, orthopnea, edema, palpitations or syncope  Chest - no breast masses  Resp - no wheezing, chronic coughing, dyspnea  Genitals - no genital lesions, discharge, masses, ulceration, warts  Ortho - no swelling, dec ROM, myalgias  Derm - no nail abnormalities, rashes, lesions of note, hair loss    Visit Vitals  BP (!) 143/92   Pulse 66   Temp 96.8 °F (36 °C) (Tympanic)   Resp 14   Ht 5' 10.25\" (1.784 m)   Wt 212 lb (96.2 kg)   SpO2 97%   BMI 30.20 kg/m²     A&O x3  Affect is appropriate. Mood stable  No apparent distress  Anicteric, no JVD, adenopathy or thyromegaly. No carotid bruits or radiated murmur  Lungs clear to auscultation, no wheezes or rales  Heart showed regular rate and rhythm. No murmur, rubs, gallops  Abdomen soft nontender, no hepatosplenomegaly or masses. Extremities without edema.   Pulses 1-2+ symmetrically    LABS  From 7/10 showed   gluc 83, cr 0.90, gfr>60, alt 63,        chol 156, tg 67,   hdl 46, ldl-c 97,   wbc 5.3, hb 16.1, plt 198, tsh 1.67, psa 0.60  From 7/14 showed   gluc 88, cr 1.04, gfr>60, alt 57, hba1c 5.3, chol 187, tg 194, hdl 43, ldl-c 105, wbc 7.1, hb 17.6, plt 207, tsh 1.80, psa 0.90, ua neg  From 8/14 showed                 alt 71,                                 ast 34, ap 83, tb 0.6, hep b/c neg, bhavin neg, fe 101, %sat 31, ferritin 241, cerulo 17.5  From 2/15 showed   gluc 96, cr 1.02, gfr>60, alt 37  From 10/15 showed gluc 92, cr 0.95, gfr>60, alt 27,        chol 185, tg 104, hdl 44, ldl-c 120  From 10/16 showed gluc 92, cr 1.07, gfr>60, alt 62,        chol 194, tg 47,   hdl 59, ldl-c 126, wbc 5.4, hb 16.7, plt 185,            psa 0.80, ua neg  From 10/17 showed gluc 94, cr 0.99, gfr>60, alt 87,        chol 188, tg 91,   hdl 53, ldl-c 117, wbc 6.6, hb 16.9, plt 257,            psa 1.40, ast 30, ap 94, tb 0.9  From 11/18 showed gluc 96, cr 1.03, gfr>60, alt 74,        chol 193, tg 81,   hdl 46, ldl-c 131, wbc 6.0, hb 16.5, plt 207  From 3/19 showed   gluc 90, cr 1.02, gfr>60,         chol 200, tg 193, hdl 43, ldl-c 118,                          psa 1.00, ua neg  From 11/19 showed gluc 99, cr 0.96, gfr>60, alt 110,         chol 136, tg 79,   hdl 54, ldl-c 66,          ast 40, ap 86, tb 0.9,  From 5/20 showed   gluc 90, cr 0.88, gfr>60, alt 88,        chol 142, tg 89,   hdl 56, ldl-c 68,   wbc 7.0, hb 17.2, plt 234,            psa 1.00, ast 37, ap 75, tb 1.0    Results for orders placed or performed during the hospital encounter of 57/35/62   METABOLIC PANEL, BASIC   Result Value Ref Range    Sodium 142 136 - 145 mmol/L    Potassium 4.2 3.5 - 5.5 mmol/L    Chloride 107 100 - 111 mmol/L    CO2 28 21 - 32 mmol/L    Anion gap 7 3.0 - 18 mmol/L    Glucose 101 (H) 74 - 99 mg/dL    BUN 18 7.0 - 18 MG/DL    Creatinine 1.00 0.6 - 1.3 MG/DL    BUN/Creatinine ratio 18 12 - 20      GFR est AA >60 >60 ml/min/1.73m2    GFR est non-AA >60 >60 ml/min/1.73m2    Calcium 9.6 8.5 - 10.1 MG/DL     We reviewed the patient's labs from the last several visits to point out trends in the numbers        Patient Active Problem List   Diagnosis Code    Dyslipidemia E78.5    Gastroesophageal reflux disease without esophagitis K21.9    Fatty liver K76.0    BPH with obstruction/lower urinary tract symptoms N40.1, N13.8    Primary hypertension I10    Erectile dysfunction N52.9    Agatston CAC score 100-199 R93.1    Colon adenoma D12.6     Assessment and plan:  1. GERD. Continue ppi and avoidance measures  2. Dyslipidemia. Continue statin  3. BPH/LUTS. Observation  4. Fatty liver. Weight loss  5. Polyp. Fiber, colo already scheduled  6. HTN. Continue amlo and add benazepril, split dose morning and evening, sfx discussed. F/u 4 mos  7. Overweight. Lifestyle and dietary measures  8. Possible IFG?   Follow, check a1c next visit, wt loss as above      RTC 6/21    Above conditions discussed at length and patient vocalized understanding.   All questions answered to patient satisfaction

## 2020-12-02 ENCOUNTER — APPOINTMENT (OUTPATIENT)
Dept: INTERNAL MEDICINE CLINIC | Age: 60
End: 2020-12-02

## 2020-12-02 ENCOUNTER — HOSPITAL ENCOUNTER (OUTPATIENT)
Dept: LAB | Age: 60
Discharge: HOME | End: 2020-12-02
Payer: COMMERCIAL

## 2020-12-02 DIAGNOSIS — I10 PRIMARY HYPERTENSION: ICD-10-CM

## 2020-12-02 LAB
ANION GAP SERPL CALC-SCNC: 7 MMOL/L (ref 3–18)
BUN SERPL-MCNC: 18 MG/DL (ref 7–18)
BUN/CREAT SERPL: 18 (ref 12–20)
CALCIUM SERPL-MCNC: 9.6 MG/DL (ref 8.5–10.1)
CHLORIDE SERPL-SCNC: 107 MMOL/L (ref 100–111)
CO2 SERPL-SCNC: 28 MMOL/L (ref 21–32)
CREAT SERPL-MCNC: 1 MG/DL (ref 0.6–1.3)
GLUCOSE SERPL-MCNC: 101 MG/DL (ref 74–99)
POTASSIUM SERPL-SCNC: 4.2 MMOL/L (ref 3.5–5.5)
SODIUM SERPL-SCNC: 142 MMOL/L (ref 136–145)

## 2020-12-02 PROCEDURE — 80048 BASIC METABOLIC PNL TOTAL CA: CPT

## 2020-12-02 PROCEDURE — 36415 COLL VENOUS BLD VENIPUNCTURE: CPT

## 2020-12-08 ENCOUNTER — OFFICE VISIT (OUTPATIENT)
Dept: INTERNAL MEDICINE CLINIC | Age: 60
End: 2020-12-08
Payer: COMMERCIAL

## 2020-12-08 VITALS
RESPIRATION RATE: 14 BRPM | WEIGHT: 212 LBS | DIASTOLIC BLOOD PRESSURE: 92 MMHG | OXYGEN SATURATION: 97 % | SYSTOLIC BLOOD PRESSURE: 143 MMHG | TEMPERATURE: 96.8 F | BODY MASS INDEX: 30.35 KG/M2 | HEIGHT: 70 IN | HEART RATE: 66 BPM

## 2020-12-08 DIAGNOSIS — D12.6 ADENOMATOUS POLYP OF COLON, UNSPECIFIED PART OF COLON: ICD-10-CM

## 2020-12-08 DIAGNOSIS — R73.01 IFG (IMPAIRED FASTING GLUCOSE): ICD-10-CM

## 2020-12-08 DIAGNOSIS — I10 PRIMARY HYPERTENSION: Primary | ICD-10-CM

## 2020-12-08 DIAGNOSIS — Z00.00 PHYSICAL EXAM: ICD-10-CM

## 2020-12-08 DIAGNOSIS — E78.5 DYSLIPIDEMIA: ICD-10-CM

## 2020-12-08 DIAGNOSIS — Z23 NEEDS FLU SHOT: ICD-10-CM

## 2020-12-08 DIAGNOSIS — K21.9 GASTROESOPHAGEAL REFLUX DISEASE WITHOUT ESOPHAGITIS: ICD-10-CM

## 2020-12-08 DIAGNOSIS — D12.6 COLON ADENOMA: ICD-10-CM

## 2020-12-08 DIAGNOSIS — K76.0 FATTY LIVER: ICD-10-CM

## 2020-12-08 DIAGNOSIS — R93.1 AGATSTON CAC SCORE 100-199: ICD-10-CM

## 2020-12-08 PROCEDURE — 90471 IMMUNIZATION ADMIN: CPT | Performed by: INTERNAL MEDICINE

## 2020-12-08 PROCEDURE — 90686 IIV4 VACC NO PRSV 0.5 ML IM: CPT | Performed by: INTERNAL MEDICINE

## 2020-12-08 PROCEDURE — 99214 OFFICE O/P EST MOD 30 MIN: CPT | Performed by: INTERNAL MEDICINE

## 2020-12-08 RX ORDER — LOSARTAN POTASSIUM 50 MG/1
50 TABLET ORAL DAILY
Qty: 90 TAB | Refills: 3 | Status: SHIPPED | OUTPATIENT
Start: 2020-12-08 | End: 2021-12-02

## 2020-12-08 NOTE — PATIENT INSTRUCTIONS
Vaccine Information Statement    Influenza (Flu) Vaccine (Inactivated or Recombinant): What You Need to Know    Many Vaccine Information Statements are available in Bulgarian and other languages. See www.immunize.org/vis  Hojas de información sobre vacunas están disponibles en español y en muchos otros idiomas. Visite www.immunize.org/vis    1. Why get vaccinated? Influenza vaccine can prevent influenza (flu). Flu is a contagious disease that spreads around the [facility] every year, usually between October and May. Anyone can get the flu, but it is more dangerous for some people. Infants and young children, people 72years of age and older, pregnant women, and people with certain health conditions or a weakened immune system are at greatest risk of flu complications. Pneumonia, bronchitis, sinus infections and ear infections are examples of flu-related complications. If you have a medical condition, such as heart disease, cancer or diabetes, flu can make it worse. Flu can cause fever and chills, sore throat, muscle aches, fatigue, cough, headache, and runny or stuffy nose. Some people may have vomiting and diarrhea, though this is more common in children than adults. Each year thousands of people in the [facility] die from flu, and many more are hospitalized. Flu vaccine prevents millions of illnesses and flu-related visits to the doctor each year. 2. Influenza vaccines     CDC recommends everyone 10months of age and older get vaccinated every flu season. Children 6 months through 6years of age may need 2 doses during a single flu season. Everyone else needs only 1 dose each flu season. It takes about 2 weeks for protection to develop after vaccination. There are many flu viruses, and they are always changing. Each year a new flu vaccine is made to protect against three or four viruses that are likely to cause disease in the upcoming flu season.  Even when the vaccine doesnt exactly match these viruses, it may still provide some protection. Influenza vaccine does not cause flu. Influenza vaccine may be given at the same time as other vaccines. 3. Talk with your health care provider    Tell your vaccine provider if the person getting the vaccine:   Has had an allergic reaction after a previous dose of influenza vaccine, or has any severe, life-threatening allergies.  Has ever had Guillain-Barré Syndrome (also called GBS). In some cases, your health care provider may decide to postpone influenza vaccination to a future visit. People with minor illnesses, such as a cold, may be vaccinated. People who are moderately or severely ill should usually wait until they recover before getting influenza vaccine. Your health care provider can give you more information. 4. Risks of a reaction     Soreness, redness, and swelling where shot is given, fever, muscle aches, and headache can happen after influenza vaccine.  There may be a very small increased risk of Guillain-Barré Syndrome (GBS) after inactivated influenza vaccine (the flu shot). [facility] children who get the flu shot along with pneumococcal vaccine (PCV13), and/or DTaP vaccine at the same time might be slightly more likely to have a seizure caused by fever. Tell your health care provider if a child who is getting flu vaccine has ever had a seizure. People sometimes faint after medical procedures, including vaccination. Tell your provider if you feel dizzy or have vision changes or ringing in the ears. As with any medicine, there is a very remote chance of a vaccine causing a severe allergic reaction, other serious injury, or death. 5. What if there is a serious problem? An allergic reaction could occur after the vaccinated person leaves the clinic.  If you see signs of a severe allergic reaction (hives, swelling of the face and throat, difficulty breathing, a fast heartbeat, dizziness, or weakness), call 9-1-1 and get the person to the nearest hospital.    For other signs that concern you, call your health care provider. Adverse reactions should be reported to the Vaccine Adverse Event Reporting System (VAERS). Your health care provider will usually file this report, or you can do it yourself. Visit the VAERS website at www.vaers. [facility].gov or call 2-105.706.4491. VAERS is only for reporting reactions, and VAERS staff do not give medical advice. 6. The National Vaccine Injury Compensation Program    The [facility] Vaccine Injury Compensation Program (VICP) is a federal program that was created to compensate people who may have been injured by certain vaccines. Visit the VICP website at www.[facility].gov/vaccinecompensation or call 8-483.703.1308 to learn about the program and about filing a claim. There is a time limit to file a claim for compensation. 7. How can I learn more?  Ask your health care provider.  Call your local or state health department.  Contact the Centers for Disease Control and Prevention (CDC):  - Call 6-572.715.6979 (1-800-CDC-INFO) or  - Visit CDCs influenza website at www.cdc.gov/flu    Vaccine Information Statement (Interim)  Inactivated Influenza Vaccine   8/15/2019  42 MARIAJOSE Jewell [id number]   Department of Health and Human Services  Centers for Disease Control and Prevention    Office Use Only

## 2020-12-08 NOTE — PROGRESS NOTES
Rula Rj 1960 male who presents for routine immunizations. Patient denies any symptoms , reactions or allergies that would exclude them from being immunized today. Risks and adverse reactions were discussed and the VIS was given to them. All questions were addressed. Order placed for Influenza,  per Verbal Order from [address] with read back. Patient was observed for 15 min post injection. There were no reactions observed.     Cheryle Cornfield, LPN

## 2021-03-30 DIAGNOSIS — I10 PRIMARY HYPERTENSION: ICD-10-CM

## 2021-03-30 RX ORDER — AMLODIPINE BESYLATE 10 MG/1
TABLET ORAL
Qty: 90 TAB | Refills: 3 | Status: SHIPPED | OUTPATIENT
Start: 2021-03-30 | End: 2022-03-28

## 2021-05-31 NOTE — PROGRESS NOTES
59 y.o. WHITE male who presents for RPE    No cardiovascular complaints. Bp has been running in the 110-130s over 70-80s when checked. Trying to be active using his wife's exercise equipment    Denies gi or gu complaints. Diet is better, he's cutting back carbs    Vitals 6/8/2021 12/8/2020 11/22/2019 3/19/2019 11/15/2018   Weight 205 lb 212 lb 199 lb 204 lb 203 lb     Past Medical History:   Diagnosis Date    Agatston CAC score 100-199 03/2019    ca score 181; calcifications left main and LAD    BPH with obstruction/lower urinary tract symptoms 08/2014    I-PSS 13    Colon adenomas 08/13/2020    Dr Yang Paz 2 adenomas    Colon polyp 09/2010    Dr Darby Degroot Depression     Dyslipidemia     calculated 10 year risk score 7.6% (10/15); 6.4% (10/16); 8.7% (11/17); 13.5% (11/18); 13% (3/19)    Erectile dysfunction 2019    Fatty liver     Us 8/14 negative serologies; Fib-4 was 0.87 from (7/14)    FHx: heart disease     GERD (gastroesophageal reflux disease)     EGD 10/14 Dr Miguel Galicia IFG (impaired fasting glucose) 06/2021    Left-sided tinnitus     and hearing loss; saw Dr Blaze Lozada 2010?     Nephrolithiasis     Overweight(278.02)     peak weight 207 lbs, bmi 29.8 from 8/14; IF 1/19 start weight 203 lbs but unable to do; keto in past also    Primary hypertension 11/15/2018    Pulmonary nodule 03/2019    1.4 cm calcified; no change, no f/u rec     Umbilical hernia     and ventral hernia     Past Surgical History:   Procedure Laterality Date    HX COLONOSCOPY      Dr Fish Asa polyp 9/10; Dr Yang Paz 8/13/20 divertics and adenomas     Social History     Socioeconomic History    Marital status:      Spouse name: Not on file    Number of children: 3    Years of education: Not on file    Highest education level: Not on file   Occupational History    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   Tobacco Use    Smoking status: Former Smoker    Smokeless tobacco: Former User   Substance and Sexual Activity    Alcohol use: Yes     Comment: occasionally    Drug use: No    Sexual activity: Not on file   Other Topics Concern    Not on file   Social History Narrative    Not on file     Social Determinants of Health     Financial Resource Strain:     Difficulty of Paying Living Expenses:    Food Insecurity:     Worried About Running Out of Food in the Last Year:     [address] in the Last Year:    Transportation Needs:     Lack of Transportation (Medical):  Lack of Transportation (Non-Medical):    Physical Activity:     Days of Exercise per Week:     Minutes of Exercise per Session:    Stress:     Feeling of Stress :    Social Connections:     Frequency of Communication with Friends and Family:     Frequency of Social Gatherings with Friends and Family:     Attends Alevism Services:     Active Member of Clubs or Organizations:     Attends Club or Organization Meetings:     Marital Status:    Intimate Partner Violence:     Fear of Current or Ex-Partner:     Emotionally Abused:     Physically Abused:     Sexually Abused:      Current Outpatient Medications   Medication Sig    amLODIPine (NORVASC) 10 mg tablet take 1 tablet by mouth once daily    losartan (COZAAR) 50 mg tablet Take 1 Tab by mouth daily.  atorvastatin (LIPITOR) 10 mg tablet take 1 tablet by mouth once daily    lansoprazole (PREVACID) 30 mg capsule take 1 capsule by mouth every morning BEFORE BREAKFAST     No current facility-administered medications for this visit.      Allergies   Allergen Reactions    Aleve [Naproxen Sodium] Rash    Benazepril Cough     REVIEW OF SYSTEMS: colo 8/20 Dr Bridget Sandhu  Ophtho - no vision change or eye pain  Oral - no mouth pain, tongue or tooth problems  Ears - no hearing loss, ear pain, fullness, no swallowing problems  Cardiac - no CP, PND, orthopnea, edema, palpitations or syncope  Chest - no breast masses  Resp - no wheezing, chronic coughing, dyspnea  Genitals - no genital lesions, discharge, masses, ulceration, warts  Ortho - no swelling, dec ROM, myalgias  Derm - no nail abnormalities, rashes, lesions of note, hair loss    Visit Vitals  /85   Pulse 71   Temp 97.9 °F (36.6 °C) (Temporal)   Resp 16   Ht 5' 10.25\" (1.784 m)   Wt 205 lb (93 kg)   SpO2 97%   BMI 29.21 kg/m²     A&O x3  Affect is appropriate. Mood stable  No apparent distress  Anicteric, no JVD, adenopathy or thyromegaly. No carotid bruits or radiated murmur  Lungs clear to auscultation, no wheezes or rales  Heart showed regular rate and rhythm. No murmur, rubs, gallops  Abdomen soft nontender, no hepatosplenomegaly or masses. Extremities without edema.   Pulses 1-2+ symmetrically  rectal showed guaiac neg brown stool normal tone  Prostate about 30 gm without asymmetry, nodularity, tenderness    LABS  From 7/10 showed   gluc 83, cr 0.90, gfr>60, alt 63,        chol 156, tg 67,   hdl 46, ldl-c 97,   wbc 5.3, hb 16.1, plt 198, tsh 1.67, psa 0.60  From 7/14 showed   gluc 88, cr 1.04, gfr>60, alt 57, hba1c 5.3, chol 187, tg 194, hdl 43, ldl-c 105, wbc 7.1, hb 17.6, plt 207, tsh 1.80, psa 0.90, ua neg  From 8/14 showed                 alt 71,                                 ast 34, ap 83, tb 0.6, hep b/c neg, bhavin neg, fe 101, %sat 31, ferritin 241, cerulo 17.5  From 2/15 showed   gluc 96, cr 1.02, gfr>60, alt 37  From 10/15 showed gluc 92, cr 0.95, gfr>60, alt 27,        chol 185, tg 104, hdl 44, ldl-c 120  From 10/16 showed gluc 92, cr 1.07, gfr>60, alt 62,        chol 194, tg 47,   hdl 59, ldl-c 126, wbc 5.4, hb 16.7, plt 185,            psa 0.80, ua neg  From 10/17 showed gluc 94, cr 0.99, gfr>60, alt 87,        chol 188, tg 91,   hdl 53, ldl-c 117, wbc 6.6, hb 16.9, plt 257,            psa 1.40, ast 30, ap 94, tb 0.9  From 11/18 showed gluc 96, cr 1.03, gfr>60, alt 74,        chol 193, tg 81,   hdl 46, ldl-c 131, wbc 6.0, hb 16.5, plt 207  From 3/19 showed   gluc 90, cr 1.02, gfr>60,         chol 200, tg 193, hdl 43, ldl-c 118, psa 1.00, ua neg  From 11/19 showed gluc 99, cr 0.96, gfr>60, alt 110,         chol 136, tg 79,   hdl 54, ldl-c 66,          ast 40, ap 86, tb 0.9,  From 5/20 showed   gluc 90, cr 0.88, gfr>60, alt 88,        chol 142, tg 89,   hdl 56, ldl-c 68,   wbc 7.0, hb 17.2, plt 234,            psa 1.00, ast 37, ap 75, tb 1.0  From 12/20 showed glu 101, cr 1.00, gfr>60    Results for orders placed or performed during the hospital encounter of 06/01/21   CBC W/O DIFF   Result Value Ref Range    WBC 7.1 4.6 - 13.2 K/uL    RBC 4.97 4.35 - 5.65 M/uL    HGB 16.4 (H) 13.0 - 16.0 g/dL    HCT 47.9 36.0 - 48.0 %    MCV 96.4 74.0 - 97.0 FL    MCH 33.0 24.0 - 34.0 PG    MCHC 34.2 31.0 - 37.0 g/dL    RDW 12.6 11.6 - 14.5 %    PLATELET 391 350 - 122 K/uL    MPV 9.8 9.2 - 11.8 FL   LIPID PANEL   Result Value Ref Range    LIPID PROFILE          Cholesterol, total 147 <200 MG/DL    Triglyceride 83 <150 MG/DL    HDL Cholesterol 53 40 - 60 MG/DL    LDL, calculated 77.4 0 - 100 MG/DL    VLDL, calculated 16.6 MG/DL    CHOL/HDL Ratio 2.8 0 - 5.0     METABOLIC PANEL, COMPREHENSIVE   Result Value Ref Range    Sodium 140 136 - 145 mmol/L    Potassium 4.4 3.5 - 5.5 mmol/L    Chloride 106 100 - 111 mmol/L    CO2 30 21 - 32 mmol/L    Anion gap 4 3.0 - 18 mmol/L    Glucose 106 (H) 74 - 99 mg/dL    BUN 20 (H) 7.0 - 18 MG/DL    Creatinine 0.97 0.6 - 1.3 MG/DL    BUN/Creatinine ratio 21 (H) 12 - 20      GFR est AA >60 >60 ml/min/1.73m2    GFR est non-AA >60 >60 ml/min/1.73m2    Calcium 9.0 8.5 - 10.1 MG/DL    Bilirubin, total 0.8 0.2 - 1.0 MG/DL    ALT (SGPT) 89 (H) 16 - 61 U/L    AST (SGOT) 37 10 - 38 U/L    Alk.  phosphatase 88 45 - 117 U/L    Protein, total 7.8 6.4 - 8.2 g/dL    Albumin 4.3 3.4 - 5.0 g/dL    Globulin 3.5 2.0 - 4.0 g/dL    A-G Ratio 1.2 0.8 - 1.7     HEMOGLOBIN A1C W/O EAG   Result Value Ref Range    Hemoglobin A1c 5.1 4.2 - 5.6 %   PSA SCREENING (SCREENING)   Result Value Ref Range    Prostate Specific Ag 1.2 0.0 - 4.0 ng/mL     We reviewed the patient's labs from the last several visits to point out trends in the numbers        Patient Active Problem List   Diagnosis Code    Dyslipidemia E78.5    Gastroesophageal reflux disease without esophagitis K21.9    Fatty liver K76.0    BPH with obstruction/lower urinary tract symptoms N40.1, N13.8    Primary hypertension I10    Erectile dysfunction N52.9    Agatston CAC score 100-199 R93.1    Colon adenoma D12.6    IFG (impaired fasting glucose) R73.01     Assessment and plan:  1. GERD. Continue ppi and avoidance measures  2. Dyslipidemia. Continue statin  3. BPH/LUTS. Observation  4. Fatty liver. Weight loss  5. Polyp. Fiber, colo already scheduled  6. HTN. Continue current   7. Overweight. Lifestyle and dietary measures  8. IFG  Lifestyle and dietary measures. Portion control reiterated. Wt loss would be ideal        RTC 12/21    Above conditions discussed at length and patient vocalized understanding.   All questions answered to patient satisfaction

## 2021-06-01 ENCOUNTER — APPOINTMENT (OUTPATIENT)
Dept: INTERNAL MEDICINE CLINIC | Age: 61
End: 2021-06-01

## 2021-06-01 ENCOUNTER — HOSPITAL ENCOUNTER (OUTPATIENT)
Dept: LAB | Age: 61
Discharge: HOME | End: 2021-06-01
Payer: COMMERCIAL

## 2021-06-01 DIAGNOSIS — Z00.00 PHYSICAL EXAM: ICD-10-CM

## 2021-06-01 DIAGNOSIS — R73.01 IFG (IMPAIRED FASTING GLUCOSE): ICD-10-CM

## 2021-06-01 LAB
ALBUMIN SERPL-MCNC: 4.3 G/DL (ref 3.4–5)
ALBUMIN/GLOB SERPL: 1.2 {RATIO} (ref 0.8–1.7)
ALP SERPL-CCNC: 88 U/L (ref 45–117)
ALT SERPL-CCNC: 89 U/L (ref 16–61)
ANION GAP SERPL CALC-SCNC: 4 MMOL/L (ref 3–18)
AST SERPL-CCNC: 37 U/L (ref 10–38)
BILIRUB SERPL-MCNC: 0.8 MG/DL (ref 0.2–1)
BUN SERPL-MCNC: 20 MG/DL (ref 7–18)
BUN/CREAT SERPL: 21 (ref 12–20)
CALCIUM SERPL-MCNC: 9 MG/DL (ref 8.5–10.1)
CHLORIDE SERPL-SCNC: 106 MMOL/L (ref 100–111)
CHOLEST SERPL-MCNC: 147 MG/DL
CO2 SERPL-SCNC: 30 MMOL/L (ref 21–32)
CREAT SERPL-MCNC: 0.97 MG/DL (ref 0.6–1.3)
ERYTHROCYTE [DISTWIDTH] IN BLOOD BY AUTOMATED COUNT: 12.6 % (ref 11.6–14.5)
GLOBULIN SER CALC-MCNC: 3.5 G/DL (ref 2–4)
GLUCOSE SERPL-MCNC: 106 MG/DL (ref 74–99)
HBA1C MFR BLD: 5.1 % (ref 4.2–5.6)
HCT VFR BLD AUTO: 47.9 % (ref 36–48)
HDLC SERPL-MCNC: 53 MG/DL (ref 40–60)
HDLC SERPL: 2.8 {RATIO} (ref 0–5)
HGB BLD-MCNC: 16.4 G/DL (ref 13–16)
LDLC SERPL CALC-MCNC: 77.4 MG/DL (ref 0–100)
LIPID PROFILE,FLP: NORMAL
MCH RBC QN AUTO: 33 PG (ref 24–34)
MCHC RBC AUTO-ENTMCNC: 34.2 G/DL (ref 31–37)
MCV RBC AUTO: 96.4 FL (ref 74–97)
PLATELET # BLD AUTO: 256 K/UL (ref 135–420)
PMV BLD AUTO: 9.8 FL (ref 9.2–11.8)
POTASSIUM SERPL-SCNC: 4.4 MMOL/L (ref 3.5–5.5)
PROT SERPL-MCNC: 7.8 G/DL (ref 6.4–8.2)
PSA SERPL-MCNC: 1.2 NG/ML (ref 0–4)
RBC # BLD AUTO: 4.97 M/UL (ref 4.35–5.65)
SODIUM SERPL-SCNC: 140 MMOL/L (ref 136–145)
TRIGL SERPL-MCNC: 83 MG/DL (ref ?–150)
VLDLC SERPL CALC-MCNC: 16.6 MG/DL
WBC # BLD AUTO: 7.1 K/UL (ref 4.6–13.2)

## 2021-06-01 PROCEDURE — 85027 COMPLETE CBC AUTOMATED: CPT

## 2021-06-01 PROCEDURE — 80061 LIPID PANEL: CPT

## 2021-06-01 PROCEDURE — 83036 HEMOGLOBIN GLYCOSYLATED A1C: CPT

## 2021-06-01 PROCEDURE — 84153 ASSAY OF PSA TOTAL: CPT

## 2021-06-01 PROCEDURE — 80053 COMPREHEN METABOLIC PANEL: CPT

## 2021-06-07 NOTE — PROGRESS NOTES
Rula Hester presents today for   Chief Complaint   Patient presents with    Follow-up    Hypertension    Cholesterol Problem    Labs     6-1-21            Coordination of Care:  1. Have you been to the ER, urgent care clinic since your last visit? Hospitalized since your last visit? no    2. Have you seen or consulted any other health care providers outside of the [address] [place] [address] since your last visit? Include any pap smears or colon screening.  yes

## 2021-06-08 ENCOUNTER — OFFICE VISIT (OUTPATIENT)
Dept: INTERNAL MEDICINE CLINIC | Age: 61
End: 2021-06-08
Payer: COMMERCIAL

## 2021-06-08 VITALS
HEIGHT: 70 IN | TEMPERATURE: 97.9 F | WEIGHT: 205 LBS | OXYGEN SATURATION: 97 % | BODY MASS INDEX: 29.35 KG/M2 | RESPIRATION RATE: 16 BRPM | HEART RATE: 71 BPM | SYSTOLIC BLOOD PRESSURE: 139 MMHG | DIASTOLIC BLOOD PRESSURE: 85 MMHG

## 2021-06-08 DIAGNOSIS — K76.0 FATTY LIVER: ICD-10-CM

## 2021-06-08 DIAGNOSIS — R93.1 AGATSTON CAC SCORE 100-199: ICD-10-CM

## 2021-06-08 DIAGNOSIS — R73.01 IFG (IMPAIRED FASTING GLUCOSE): ICD-10-CM

## 2021-06-08 DIAGNOSIS — I10 PRIMARY HYPERTENSION: ICD-10-CM

## 2021-06-08 DIAGNOSIS — D12.6 COLON ADENOMA: ICD-10-CM

## 2021-06-08 DIAGNOSIS — Z00.00 PHYSICAL EXAM: Primary | ICD-10-CM

## 2021-06-08 LAB
HEMOCCULT STL QL: NEGATIVE
VALID INTERNAL CONTROL?: YES

## 2021-06-08 PROCEDURE — 82272 OCCULT BLD FECES 1-3 TESTS: CPT | Performed by: INTERNAL MEDICINE

## 2021-06-08 PROCEDURE — 99396 PREV VISIT EST AGE 40-64: CPT | Performed by: INTERNAL MEDICINE

## 2021-07-28 RX ORDER — LANSOPRAZOLE 30 MG/1
CAPSULE, DELAYED RELEASE ORAL
Qty: 90 CAPSULE | Refills: 3 | Status: SHIPPED | OUTPATIENT
Start: 2021-07-28 | End: 2022-07-25

## 2021-09-13 DIAGNOSIS — E78.5 DYSLIPIDEMIA: ICD-10-CM

## 2021-09-15 RX ORDER — ATORVASTATIN CALCIUM 10 MG/1
TABLET, FILM COATED ORAL
Qty: 90 TABLET | Refills: 3 | Status: SHIPPED | OUTPATIENT
Start: 2021-09-15 | End: 2022-09-12

## 2021-12-02 RX ORDER — LOSARTAN POTASSIUM 50 MG/1
TABLET ORAL
Qty: 90 TABLET | Refills: 3 | Status: SHIPPED | OUTPATIENT
Start: 2021-12-02

## 2022-01-03 ENCOUNTER — TELEPHONE (OUTPATIENT)
Dept: INTERNAL MEDICINE CLINIC | Age: 62
End: 2022-01-03

## 2022-01-03 NOTE — TELEPHONE ENCOUNTER
----- Message from Jong Thomas sent at 1/3/2022  8:30 AM EST -----  Subject: Message to Provider    QUESTIONS  Information for Provider? Patient has a appointment for his lab work for   his follow up visit today 01/03/2022 at 11:45am but he tested positive for   covid 12/30/2021 and 01/02/2022 and will need to have that rescheduled. His follow up visit was rescheduled for 01/18/2022 at 1:40pm and he would   need his lab work before that. Please call patient to schedule.  ---------------------------------------------------------------------------  --------------  CALL BACK INFO  What is the best way for the office to contact you? OK to leave message on   voicemail  Preferred Call Back Phone Number? [phone]  ---------------------------------------------------------------------------  --------------  SCRIPT ANSWERS  Relationship to Patient?  Self

## 2022-01-14 ENCOUNTER — APPOINTMENT (OUTPATIENT)
Dept: INTERNAL MEDICINE CLINIC | Age: 62
End: 2022-01-14

## 2022-01-14 ENCOUNTER — HOSPITAL ENCOUNTER (OUTPATIENT)
Dept: LAB | Age: 62
Discharge: HOME | End: 2022-01-14
Payer: COMMERCIAL

## 2022-01-14 DIAGNOSIS — R73.01 IFG (IMPAIRED FASTING GLUCOSE): ICD-10-CM

## 2022-01-14 LAB
ANION GAP SERPL CALC-SCNC: 6 MMOL/L (ref 3–18)
BUN SERPL-MCNC: 19 MG/DL (ref 7–18)
BUN/CREAT SERPL: 20 (ref 12–20)
CALCIUM SERPL-MCNC: 9.1 MG/DL (ref 8.5–10.1)
CHLORIDE SERPL-SCNC: 107 MMOL/L (ref 100–111)
CO2 SERPL-SCNC: 29 MMOL/L (ref 21–32)
CREAT SERPL-MCNC: 0.93 MG/DL (ref 0.6–1.3)
EST. AVERAGE GLUCOSE BLD GHB EST-MCNC: 108 MG/DL
GLUCOSE SERPL-MCNC: 92 MG/DL (ref 74–99)
HBA1C MFR BLD: 5.4 % (ref 4.2–5.6)
POTASSIUM SERPL-SCNC: 4.1 MMOL/L (ref 3.5–5.5)
SODIUM SERPL-SCNC: 142 MMOL/L (ref 136–145)

## 2022-01-14 PROCEDURE — 80048 BASIC METABOLIC PNL TOTAL CA: CPT

## 2022-01-14 PROCEDURE — 36415 COLL VENOUS BLD VENIPUNCTURE: CPT

## 2022-01-14 PROCEDURE — 83036 HEMOGLOBIN GLYCOSYLATED A1C: CPT

## 2022-01-15 NOTE — PROGRESS NOTES
59 y.o. WHITE/NON- male who presents for evaluation    No cardiovascular complaints. Bp has been running in the 120-130s over 70-80s when checked. He has not been exercising and the diet has been off. Their eldest daughter and her family moved back in with them as they try to find a house to buy so that's changed a lot of their behaviours    Denies gi or gu complaints. He was dx'ed with mild case of covid end of 2021; currently fully vaccinated    Past Medical History:   Diagnosis Date    Agatston CAC score 100-199 03/2019    ca score 181; calcifications left main and LAD    BPH with obstruction/lower urinary tract symptoms 08/2014    I-PSS 13    Colon adenomas 08/13/2020    Dr Bri Baird 2 adenomas    Colon polyp 09/2010    Dr Kaufman Earing     COVID-19 virus infection 12/2021    Depression     Diastasis recti     Dyslipidemia     calculated 10 year risk score 7.6% (10/15); 6.4% (10/16); 8.7% (11/17); 13.5% (11/18); 13% (3/19)    Erectile dysfunction 2019    Fatty liver     Us 8/14 negative serologies; Fib-4 was 0.87 from (7/14)    FHx: heart disease     GERD (gastroesophageal reflux disease)     EGD 10/14 Dr Claudia Vitale IFG (impaired fasting glucose) 06/2021    Left-sided tinnitus     and hearing loss; saw Dr Yohan Bailon 2010?     Nephrolithiasis     Overweight(278.02)     peak weight 207 lbs, bmi 29.8 from 8/14; IF 1/19 start weight 203 lbs but unable to do; keto in past also    Primary hypertension 11/15/2018    Pulmonary nodule 03/2019    1.4 cm calcified; no change, no f/u rec     Umbilical hernia     and ventral hernia     Past Surgical History:   Procedure Laterality Date    HX COLONOSCOPY      Dr Kaufman Earing polyp 9/10; Dr Bri Baird 8/13/20 divertics and adenomas     Social History     Socioeconomic History    Marital status:      Spouse name: Not on file    Number of children: 3    Years of education: Not on file    Highest education level: Not on file   Occupational History    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   Tobacco Use    Smoking status: Former Smoker    Smokeless tobacco: Former User   Substance and Sexual Activity    Alcohol use: Yes     Comment: occasionally    Drug use: No    Sexual activity: Not on file   Other Topics Concern    Not on file   Social History Narrative    Not on file     Social Determinants of Health     Financial Resource Strain:     Difficulty of Paying Living Expenses: Not on file   Food Insecurity:     Worried About [address] in the Last Year: Not on file    [address] in the Last Year: Not on file   Transportation Needs:     Lack of Transportation (Medical): Not on file    Lack of Transportation (Non-Medical):  Not on file   Physical Activity:     Days of Exercise per Week: Not on file    Minutes of Exercise per Session: Not on file   Stress:     Feeling of Stress : Not on file   Social Connections:     Frequency of Communication with Friends and Family: Not on file    Frequency of Social Gatherings with Friends and Family: Not on file    Attends [employer] Services: Not on file    Active Member of [address] [place] [address] or Organizations: Not on file    Attends Club or Organization Meetings: Not on file    Marital Status: Not on file   Intimate Partner Violence:     Fear of Current or Ex-Partner: Not on file    Emotionally Abused: Not on file    Physically Abused: Not on file    Sexually Abused: Not on file   Housing Stability:     Unable to Pay for Housing in the Last Year: Not on file    Number of [place] in the Last Year: Not on file    Unstable Housing in the Last Year: Not on file     Current Outpatient Medications   Medication Sig    losartan (COZAAR) 50 mg tablet take 1 tablet by mouth daily    atorvastatin (LIPITOR) 10 mg tablet take 1 tablet by mouth once daily    lansoprazole (PREVACID) 30 mg capsule take 1 capsule by mouth every morning before breakfast    amLODIPine (NORVASC) 10 mg tablet take 1 tablet by mouth once daily     No current facility-administered medications for this visit. Allergies   Allergen Reactions    Aleve [Naproxen Sodium] Rash    Benazepril Cough     REVIEW OF SYSTEMS: colo 8/20 Dr Jean Marie Osborn  Ophtho - no vision change or eye pain  Oral - no mouth pain, tongue or tooth problems  Ears - no hearing loss, ear pain, fullness, no swallowing problems  Cardiac - no CP, PND, orthopnea, edema, palpitations or syncope  Chest - no breast masses  Resp - no wheezing, chronic coughing, dyspnea  Genitals - no genital lesions, discharge, masses, ulceration, warts  Ortho - no swelling, dec ROM, myalgias  Derm - no nail abnormalities, rashes, lesions of note, hair loss    Visit Vitals  BP (!) 146/82   Pulse 62   Temp 98.1 °F (36.7 °C) (Temporal)   Resp 16   Ht 5' 10.25\" (1.784 m)   Wt 203 lb (92.1 kg)   SpO2 97%   BMI 28.92 kg/m²     A&O x3  Affect is appropriate. Mood stable  No apparent distress  Anicteric, no JVD, adenopathy or thyromegaly. No carotid bruits or radiated murmur  Lungs clear to auscultation, no wheezes or rales  Heart showed regular rate and rhythm. No murmur, rubs, gallops  Abdomen soft nontender, no hepatosplenomegaly or masses. Extremities without edema.   Pulses 1-2+ symmetrically    LABS  From 7/10 showed   gluc 83, cr 0.90, gfr>60, alt 63,        chol 156, tg 67,   hdl 46, ldl-c 97,   wbc 5.3, hb 16.1, plt 198, tsh 1.67, psa 0.60  From 7/14 showed   gluc 88, cr 1.04, gfr>60, alt 57, hba1c 5.3, chol 187, tg 194, hdl 43, ldl-c 105, wbc 7.1, hb 17.6, plt 207, tsh 1.80, psa 0.90, ua neg  From 8/14 showed                 alt 71,                                 ast 34, ap 83, tb 0.6, hep b/c neg, bhavin neg, fe 101, %sat 31, ferritin 241, cerulo 17.5  From 2/15 showed   gluc 96, cr 1.02, gfr>60, alt 37  From 10/15 showed gluc 92, cr 0.95, gfr>60, alt 27,        chol 185, tg 104, hdl 44, ldl-c 120  From 10/16 showed gluc 92, cr 1.07, gfr>60, alt 62,        chol 194, tg 47,   hdl 59, ldl-c 126, wbc 5.4, hb 16.7, plt 185,            psa 0.80, ua neg  From 10/17 showed gluc 94, cr 0.99, gfr>60, alt 87,        chol 188, tg 91,   hdl 53, ldl-c 117, wbc 6.6, hb 16.9, plt 257,            psa 1.40, ast 30, ap 94, tb 0.9  From 11/18 showed gluc 96, cr 1.03, gfr>60, alt 74,        chol 193, tg 81,   hdl 46, ldl-c 131, wbc 6.0, hb 16.5, plt 207  From 3/19 showed   gluc 90, cr 1.02, gfr>60,         chol 200, tg 193, hdl 43, ldl-c 118,                          psa 1.00, ua neg  From 11/19 showed gluc 99, cr 0.96, gfr>60, alt 110,         chol 136, tg 79,   hdl 54, ldl-c 66,          ast 40, ap 86, tb 0.9,  From 5/20 showed   gluc 90, cr 0.88, gfr>60, alt 88,        chol 142, tg 89,   hdl 56, ldl-c 68,   wbc 7.0, hb 17.2, plt 234,            psa 1.00, ast 37, ap 75, tb 1.0  From 12/20 showed glu 101, cr 1.00, gfr>60  From 6/21 showed   glu 106, cr 0.97, gfr>60, alt 89, hba1c 5.1, chol 147, tg 89,   hdl 53, ldl-c 77,   wbc 7.1, hb 16.4, plt 256,            psa 1.20, ast 37, ap 88, tb 0.8    Results for orders placed or performed during the hospital encounter of 58/51/11   METABOLIC PANEL, BASIC   Result Value Ref Range    Sodium 142 136 - 145 mmol/L    Potassium 4.1 3.5 - 5.5 mmol/L    Chloride 107 100 - 111 mmol/L    CO2 29 21 - 32 mmol/L    Anion gap 6 3.0 - 18 mmol/L    Glucose 92 74 - 99 mg/dL    BUN 19 (H) 7.0 - 18 MG/DL    Creatinine 0.93 0.6 - 1.3 MG/DL    BUN/Creatinine ratio 20 12 - 20      GFR est AA >60 >60 ml/min/1.73m2    GFR est non-AA >60 >60 ml/min/1.73m2    Calcium 9.1 8.5 - 10.1 MG/DL   HEMOGLOBIN A1C WITH EAG   Result Value Ref Range    Hemoglobin A1c 5.4 4.2 - 5.6 %    Est. average glucose 108 mg/dL     We reviewed the patient's labs from the last several visits to point out trends in the numbers        Patient Active Problem List   Diagnosis Code    Dyslipidemia E78.5    Gastroesophageal reflux disease without esophagitis K21.9    Fatty liver K76.0    BPH with obstruction/lower urinary tract symptoms N40.1, N13.8    Primary hypertension I10    Erectile dysfunction N52.9    Agatston CAC score 100-199 R93.1    Colon adenoma D12.6    IFG (impaired fasting glucose) R73.01     Assessment and plan:  1. GERD. Continue ppi and avoidance measures  2. Dyslipidemia. Continue statin  3. BPH/LUTS. Observation  4. Fatty liver. Weight loss  5. Polyp. Fiber, colo 2025  6. HTN. Controlled on current   7. Overweight. Lifestyle and dietary measures  8. IFG  Lifestyle and dietary measures. Portion control reiterated. Wt loss would be ideal        RTC 12/21    Above conditions discussed at length and patient vocalized understanding. All questions answered to patient satisfaction        ICD-10-CM ICD-9-CM    1. Primary hypertension  I10 401.9 CBC W/O DIFF      METABOLIC PANEL, COMPREHENSIVE      LIPID PANEL   2. Agatston CAC score 100-199  R93.1 793.2    3. Gastroesophageal reflux disease without esophagitis  K21.9 530.81    4. Fatty liver  K76.0 571.8    5. Colon adenoma  D12.6 211.3    6. IFG (impaired fasting glucose)  R73.01 790.21 HEMOGLOBIN A1C WITH EAG   7.  Screening for malignant neoplasm of prostate  Z12.5 V76.44 PSA SCREENING (SCREENING)

## 2022-01-18 ENCOUNTER — OFFICE VISIT (OUTPATIENT)
Dept: INTERNAL MEDICINE CLINIC | Age: 62
End: 2022-01-18
Payer: COMMERCIAL

## 2022-01-18 VITALS
RESPIRATION RATE: 16 BRPM | HEART RATE: 62 BPM | TEMPERATURE: 98.1 F | DIASTOLIC BLOOD PRESSURE: 82 MMHG | BODY MASS INDEX: 29.06 KG/M2 | WEIGHT: 203 LBS | HEIGHT: 70 IN | SYSTOLIC BLOOD PRESSURE: 146 MMHG | OXYGEN SATURATION: 97 %

## 2022-01-18 DIAGNOSIS — K76.0 FATTY LIVER: ICD-10-CM

## 2022-01-18 DIAGNOSIS — K21.9 GASTROESOPHAGEAL REFLUX DISEASE WITHOUT ESOPHAGITIS: ICD-10-CM

## 2022-01-18 DIAGNOSIS — I10 PRIMARY HYPERTENSION: Primary | ICD-10-CM

## 2022-01-18 DIAGNOSIS — D12.6 COLON ADENOMA: ICD-10-CM

## 2022-01-18 DIAGNOSIS — Z12.5 SCREENING FOR MALIGNANT NEOPLASM OF PROSTATE: ICD-10-CM

## 2022-01-18 DIAGNOSIS — R73.01 IFG (IMPAIRED FASTING GLUCOSE): ICD-10-CM

## 2022-01-18 DIAGNOSIS — R93.1 AGATSTON CAC SCORE 100-199: ICD-10-CM

## 2022-01-18 PROCEDURE — 99214 OFFICE O/P EST MOD 30 MIN: CPT | Performed by: INTERNAL MEDICINE

## 2022-01-18 NOTE — PROGRESS NOTES
Chloe De La Fuente presents with   Chief Complaint   Patient presents with    Follow-up     6 month    Hypertension    Cholesterol Problem    Labs     1-14-22          1. \"Have you been to the ER, urgent care clinic since your last visit? Hospitalized since your last visit? \" NO    2. \"Have you seen or consulted any other health care providers outside of the [address] [place] [address] since your last visit? \" NO    3. For patients aged 39-70: Has the patient had a colonoscopy? Yes - no Care Gap present     If the patient is female:    4. For patients aged 41-77: Has the patient had a mammogram within the past 2 years? NA - based on age    11. For patients aged 21-65: Has the patient had a pap smear?  NA - based on age

## 2022-02-04 ENCOUNTER — TELEPHONE (OUTPATIENT)
Dept: INTERNAL MEDICINE CLINIC | Age: 62
End: 2022-02-04

## 2022-02-04 NOTE — TELEPHONE ENCOUNTER
Pt calling about   lansoprazole (PREVACID) 30 mg capsule    Stated at last [address] 01/31/22 the nurse was going to start prior auth for this medication. Stated it is needed yearly     Stated when he call the pharmacy for refill he was advised it was not approved, \"some issue with the insurance co.    Pt request return call with status of prior auth or please start one if needed.      He is now out of medication

## 2022-02-04 NOTE — TELEPHONE ENCOUNTER
Pt calling, wants to know if pharmacy was notified of the prior auth approval so he can get his refill

## 2022-03-19 PROBLEM — D12.6 COLON ADENOMA: Status: ACTIVE | Noted: 2020-12-08

## 2022-03-19 PROBLEM — N40.1 BPH WITH OBSTRUCTION/LOWER URINARY TRACT SYMPTOMS: Status: ACTIVE | Noted: 2017-11-07

## 2022-03-19 PROBLEM — R93.1 AGATSTON CAC SCORE 100-199: Status: ACTIVE | Noted: 2019-03-29

## 2022-03-19 PROBLEM — N13.8 BPH WITH OBSTRUCTION/LOWER URINARY TRACT SYMPTOMS: Status: ACTIVE | Noted: 2017-11-07

## 2022-03-19 PROBLEM — N52.9 ERECTILE DYSFUNCTION: Status: ACTIVE | Noted: 2019-03-19

## 2022-03-19 PROBLEM — R73.01 IFG (IMPAIRED FASTING GLUCOSE): Status: ACTIVE | Noted: 2021-06-08

## 2022-03-19 PROBLEM — I10 PRIMARY HYPERTENSION: Status: ACTIVE | Noted: 2018-11-15

## 2022-03-19 PROBLEM — K76.0 FATTY LIVER: Status: ACTIVE | Noted: 2017-11-07

## 2022-03-26 DIAGNOSIS — I10 PRIMARY HYPERTENSION: ICD-10-CM

## 2022-03-28 RX ORDER — AMLODIPINE BESYLATE 10 MG/1
TABLET ORAL
Qty: 90 TABLET | Refills: 3 | Status: SHIPPED | OUTPATIENT
Start: 2022-03-28

## 2022-07-13 ENCOUNTER — APPOINTMENT (OUTPATIENT)
Dept: INTERNAL MEDICINE CLINIC | Age: 62
End: 2022-07-13

## 2022-07-13 ENCOUNTER — HOSPITAL ENCOUNTER (OUTPATIENT)
Dept: LAB | Age: 62
Discharge: HOME | End: 2022-07-13
Payer: COMMERCIAL

## 2022-07-13 DIAGNOSIS — I10 PRIMARY HYPERTENSION: ICD-10-CM

## 2022-07-13 DIAGNOSIS — Z12.5 SCREENING FOR MALIGNANT NEOPLASM OF PROSTATE: ICD-10-CM

## 2022-07-13 DIAGNOSIS — R73.01 IFG (IMPAIRED FASTING GLUCOSE): ICD-10-CM

## 2022-07-13 LAB
ALBUMIN SERPL-MCNC: 4.2 G/DL (ref 3.4–5)
ALBUMIN/GLOB SERPL: 1.3 {RATIO} (ref 0.8–1.7)
ALP SERPL-CCNC: 90 U/L (ref 45–117)
ALT SERPL-CCNC: 99 U/L (ref 16–61)
ANION GAP SERPL CALC-SCNC: 6 MMOL/L (ref 3–18)
AST SERPL-CCNC: 49 U/L (ref 10–38)
BILIRUB SERPL-MCNC: 0.8 MG/DL (ref 0.2–1)
BUN SERPL-MCNC: 15 MG/DL (ref 7–18)
BUN/CREAT SERPL: 15 (ref 12–20)
CALCIUM SERPL-MCNC: 9.1 MG/DL (ref 8.5–10.1)
CHLORIDE SERPL-SCNC: 107 MMOL/L (ref 100–111)
CHOLEST SERPL-MCNC: 145 MG/DL
CO2 SERPL-SCNC: 29 MMOL/L (ref 21–32)
CREAT SERPL-MCNC: 0.99 MG/DL (ref 0.6–1.3)
ERYTHROCYTE [DISTWIDTH] IN BLOOD BY AUTOMATED COUNT: 12.9 % (ref 11.6–14.5)
EST. AVERAGE GLUCOSE BLD GHB EST-MCNC: 108 MG/DL
GLOBULIN SER CALC-MCNC: 3.2 G/DL (ref 2–4)
GLUCOSE SERPL-MCNC: 102 MG/DL (ref 74–99)
HBA1C MFR BLD: 5.4 % (ref 4.2–5.6)
HCT VFR BLD AUTO: 46.8 % (ref 36–48)
HDLC SERPL-MCNC: 51 MG/DL (ref 40–60)
HDLC SERPL: 2.8 {RATIO} (ref 0–5)
HGB BLD-MCNC: 16.3 G/DL (ref 13–16)
LDLC SERPL CALC-MCNC: 77.4 MG/DL (ref 0–100)
LIPID PROFILE,FLP: NORMAL
MCH RBC QN AUTO: 33.1 PG (ref 24–34)
MCHC RBC AUTO-ENTMCNC: 34.8 G/DL (ref 31–37)
MCV RBC AUTO: 94.9 FL (ref 78–100)
NRBC # BLD: 0 K/UL (ref 0–0.01)
NRBC BLD-RTO: 0 PER 100 WBC
PLATELET # BLD AUTO: 215 K/UL (ref 135–420)
PMV BLD AUTO: 10.2 FL (ref 9.2–11.8)
POTASSIUM SERPL-SCNC: 4.3 MMOL/L (ref 3.5–5.5)
PROT SERPL-MCNC: 7.4 G/DL (ref 6.4–8.2)
PSA SERPL-MCNC: 1.3 NG/ML (ref 0–4)
RBC # BLD AUTO: 4.93 M/UL (ref 4.35–5.65)
SODIUM SERPL-SCNC: 142 MMOL/L (ref 136–145)
TRIGL SERPL-MCNC: 83 MG/DL (ref ?–150)
VLDLC SERPL CALC-MCNC: 16.6 MG/DL
WBC # BLD AUTO: 6.2 K/UL (ref 4.6–13.2)

## 2022-07-13 PROCEDURE — 85027 COMPLETE CBC AUTOMATED: CPT

## 2022-07-13 PROCEDURE — 80061 LIPID PANEL: CPT

## 2022-07-13 PROCEDURE — 80053 COMPREHEN METABOLIC PANEL: CPT

## 2022-07-13 PROCEDURE — 84153 ASSAY OF PSA TOTAL: CPT

## 2022-07-13 PROCEDURE — 83036 HEMOGLOBIN GLYCOSYLATED A1C: CPT

## 2022-07-13 PROCEDURE — 36415 COLL VENOUS BLD VENIPUNCTURE: CPT

## 2022-07-15 NOTE — PROGRESS NOTES
57 y.o. WHITE/NON- male who presents for evaluation    No cardiovascular complaints. Bp inn the 125 over 70 to low 80s. He has not been exercising and the diet has been off. Their eldest daughter and her family moved back in with them and they recently left, the pt and wife have not adjusted their lifestyle back as yet but plan on doing so. Unable to lose weight    Vitals 7/20/2022 1/18/2022 6/8/2021 12/8/2020 11/22/2019   Weight 212 lb 203 lb 205 lb 212 lb 199 lb     Denies gi or gu complaints. Past Medical History:   Diagnosis Date    Agatston CAC score 100-199 03/2019    ca score 181; calcifications left main and LAD    BPH with obstruction/lower urinary tract symptoms 08/2014    I-PSS 13    Colon adenomas 08/13/2020    Dr Myra Bonilla 2 adenomas    Colon polyp 09/2010    Dr Spike Torres     COVID-19 virus infection 12/2021    Depression     Diastasis recti     Dyslipidemia     calculated 10 year risk score 7.6% (10/15); 6.4% (10/16); 8.7% (11/17); 13.5% (11/18); 13% (3/19)    Erectile dysfunction 2019    Fatty liver     Us 8/14 negative serologies; Fib-4 was 0.87 from (7/14)    FHx: heart disease     GERD (gastroesophageal reflux disease)     EGD 10/14 Dr Pamela Messina    IFG (impaired fasting glucose) 06/2021    Left-sided tinnitus     and hearing loss; saw Dr Aaron Powers 2010?     Nephrolithiasis     Overweight(278.02)     peak weight 207 lbs, bmi 29.8 from 8/14; IF 1/19 start weight 203 lbs but unable to do; keto in past also    Primary hypertension 11/15/2018    Pulmonary nodule 03/2019    1.4 cm calcified; no change, no f/u rec     Umbilical hernia     and ventral hernia     Past Surgical History:   Procedure Laterality Date    HX COLONOSCOPY      Dr [address] polyp 9/10; Dr Myra Bonilla 8/13/20 divertics and adenomas     Social History     Socioeconomic History    Marital status:      Spouse name: Not on file    Number of children: 3    Years of education: Not on file    Highest education level: Not on file Occupational History    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 "[employer]   Tobacco Use    Smoking status: Former    Smokeless tobacco: Former   Substance and Sexual Activity    Alcohol use: Yes     Comment: occasionally    Drug use: No    Sexual activity: Not on file   Other Topics Concern    Not on file   Social History Narrative    Not on file     Social Determinants of Health     Financial Resource Strain: Not on file   Food Insecurity: Not on file   Transportation Needs: Not on file   Physical Activity: Not on file   Stress: Not on file   Social Connections: Not on file   Intimate Partner Violence: Not on file   Housing Stability: Not on file     Current Outpatient Medications   Medication Sig    amLODIPine (NORVASC) 10 mg tablet take 1 tablet by mouth once daily    losartan (COZAAR) 50 mg tablet take 1 tablet by mouth daily    atorvastatin (LIPITOR) 10 mg tablet take 1 tablet by mouth once daily    lansoprazole (PREVACID) 30 mg capsule take 1 capsule by mouth every morning before breakfast     No current facility-administered medications for this visit. Allergies   Allergen Reactions    Aleve [Naproxen Sodium] Rash    Benazepril Cough     REVIEW OF SYSTEMS: colo 8/20 Dr Venkat Zapien  Ophtho - no vision change or eye pain  Oral - no mouth pain, tongue or tooth problems  Ears - no hearing loss, ear pain, fullness, no swallowing problems  Cardiac - no CP, PND, orthopnea, edema, palpitations or syncope  Chest - no breast masses  Resp - no wheezing, chronic coughing, dyspnea  Genitals - no genital lesions, discharge, masses, ulceration, warts  Ortho - no swelling, dec ROM, myalgias  Derm - no nail abnormalities, rashes, lesions of note, hair loss    Visit Vitals  /83   Pulse 68   Temp 97 °F (36.1 °C) (Temporal)   Resp 16   Ht 5' 10.25\" (1.784 m)   Wt 212 lb (96.2 kg)   SpO2 98%   BMI 30.20 kg/m²     A&O x3  Affect is appropriate. Mood stable  No apparent distress  Anicteric, no JVD, adenopathy or thyromegaly.    No carotid bruits or radiated murmur  Lungs clear to auscultation, no wheezes or rales  Heart showed regular rate and rhythm. No murmur, rubs, gallops  Abdomen soft nontender, no hepatosplenomegaly or masses. Extremities without edema.   Pulses 1-2+ symmetrically    LABS  From 7/10 showed   gluc 83, cr 0.90, gfr>60, alt 63,        chol 156, tg 67,   hdl 46, ldl-c 97,   wbc 5.3, hb 16.1, plt 198, tsh 1.67, psa 0.60  From 7/14 showed   gluc 88, cr 1.04, gfr>60, alt 57, hba1c 5.3, chol 187, tg 194, hdl 43, ldl-c 105, wbc 7.1, hb 17.6, plt 207, tsh 1.80, psa 0.90, ua neg  From 8/14 showed                 alt 71,                                 ast 34, ap 83, tb 0.6, hep b/c neg, bhavin neg, fe 101, %sat 31, ferritin 241, cerulo 17.5  From 2/15 showed   gluc 96, cr 1.02, gfr>60, alt 37  From 10/15 showed gluc 92, cr 0.95, gfr>60, alt 27,        chol 185, tg 104, hdl 44, ldl-c 120  From 10/16 showed gluc 92, cr 1.07, gfr>60, alt 62,        chol 194, tg 47,   hdl 59, ldl-c 126, wbc 5.4, hb 16.7, plt 185,            psa 0.80, ua neg  From 10/17 showed gluc 94, cr 0.99, gfr>60, alt 87,        chol 188, tg 91,   hdl 53, ldl-c 117, wbc 6.6, hb 16.9, plt 257,            psa 1.40, ast 30, ap 94, tb 0.9  From 11/18 showed gluc 96, cr 1.03, gfr>60, alt 74,        chol 193, tg 81,   hdl 46, ldl-c 131, wbc 6.0, hb 16.5, plt 207  From 3/19 showed   gluc 90, cr 1.02, gfr>60,         chol 200, tg 193, hdl 43, ldl-c 118,                          psa 1.00, ua neg  From 11/19 showed gluc 99, cr 0.96, gfr>60, alt 110,         chol 136, tg 79,   hdl 54, ldl-c 66,          ast 40, ap 86, tb 0.9,  From 5/20 showed   gluc 90, cr 0.88, gfr>60, alt 88,        chol 142, tg 89,   hdl 56, ldl-c 68,   wbc 7.0, hb 17.2, plt 234,            psa 1.00, ast 37, ap 75, tb 1.0  From 12/20 showed glu 101, cr 1.00, gfr>60  From 6/21 showed   glu 106, cr 0.97, gfr>60, alt 89, hba1c 5.1, chol 147, tg 89,   hdl 53, ldl-c 77,   wbc 7.1, hb 16.4, plt 256,            psa 1.20, ast 37, ap 88, tb 0.8  From 1/22 showed   gluc 92, cr 0.93, gfr>60,  hba1c 5.4    Results for orders placed or performed during the hospital encounter of 07/13/22   CBC W/O DIFF   Result Value Ref Range    WBC 6.2 4.6 - 13.2 K/uL    RBC 4.93 4.35 - 5.65 M/uL    HGB 16.3 (H) 13.0 - 16.0 g/dL    HCT 46.8 36.0 - 48.0 %    MCV 94.9 78.0 - 100.0 FL    MCH 33.1 24.0 - 34.0 PG    MCHC 34.8 31.0 - 37.0 g/dL    RDW 12.9 11.6 - 14.5 %    PLATELET 310 741 - 174 K/uL    MPV 10.2 9.2 - 11.8 FL    NRBC 0.0 0  WBC    ABSOLUTE NRBC 0.00 0.00 - 8.02 K/uL   METABOLIC PANEL, COMPREHENSIVE   Result Value Ref Range    Sodium 142 136 - 145 mmol/L    Potassium 4.3 3.5 - 5.5 mmol/L    Chloride 107 100 - 111 mmol/L    CO2 29 21 - 32 mmol/L    Anion gap 6 3.0 - 18 mmol/L    Glucose 102 (H) 74 - 99 mg/dL    BUN 15 7.0 - 18 MG/DL    Creatinine 0.99 0.6 - 1.3 MG/DL    BUN/Creatinine ratio 15 12 - 20      GFR est AA >60 >60 ml/min/1.73m2    GFR est non-AA >60 >60 ml/min/1.73m2    Calcium 9.1 8.5 - 10.1 MG/DL    Bilirubin, total 0.8 0.2 - 1.0 MG/DL    ALT (SGPT) 99 (H) 16 - 61 U/L    AST (SGOT) 49 (H) 10 - 38 U/L    Alk.  phosphatase 90 45 - 117 U/L    Protein, total 7.4 6.4 - 8.2 g/dL    Albumin 4.2 3.4 - 5.0 g/dL    Globulin 3.2 2.0 - 4.0 g/dL    A-G Ratio 1.3 0.8 - 1.7     LIPID PANEL   Result Value Ref Range    LIPID PROFILE          Cholesterol, total 145 <200 MG/DL    Triglyceride 83 <150 MG/DL    HDL Cholesterol 51 40 - 60 MG/DL    LDL, calculated 77.4 0 - 100 MG/DL    VLDL, calculated 16.6 MG/DL    CHOL/HDL Ratio 2.8 0 - 5.0     PSA SCREENING (SCREENING)   Result Value Ref Range    Prostate Specific Ag 1.3 0.0 - 4.0 ng/mL   HEMOGLOBIN A1C WITH EAG   Result Value Ref Range    Hemoglobin A1c 5.4 4.2 - 5.6 %    Est. average glucose 108 mg/dL     We reviewed the patient's labs from the last several visits to point out trends in the numbers        Patient Active Problem List   Diagnosis Code    Dyslipidemia E78.5    Gastroesophageal reflux disease without esophagitis K21.9    Fatty liver K76.0    BPH with obstruction/lower urinary tract symptoms N40.1, N13.8    Primary hypertension I10    Erectile dysfunction N52.9    Agatston CAC score 100-199 R93.1    Colon adenoma D12.6    IFG (impaired fasting glucose) R73.01    Obesity (BMI 30-39. 9) E66.9     Assessment and plan:  1. GERD. Continue ppi and avoidance measures  2. Dyslipidemia. Continue statin, we did speak about possibly inc the dosing but held off as he will redouble his efforts on diet and exercise  3. BPH/LUTS. Observation  4. Fatty liver. Weight loss again recommended. Discussed getting opinion from hepatology which he agreed with  5. Polyp. Fiber, colo 2025  6. HTN. Controlled on current   7. Overweight. Lifestyle and dietary measures  8. IFG  Lifestyle and dietary measures. Portion control reiterated. Wt loss as above        RTC 1/23    Above conditions discussed at length and patient vocalized understanding. All questions answered to patient satisfaction        ICD-10-CM ICD-9-CM    1. Fatty liver  K76.0 571.8 REFERRAL TO GASTROENTEROLOGY      2. Transaminasemia  R74.01 790.4 REFERRAL TO GASTROENTEROLOGY      3. Agatston CAC score 100-199  R93.1 793.2       4. Dyslipidemia  E78.5 272.4 LIPID PANEL      5. Obesity (BMI 30-39. 9)  E66.9 278.00       6. Primary hypertension  I10 401.9       7.  IFG (impaired fasting glucose)  D92.01 999.17 METABOLIC PANEL, COMPREHENSIVE      HEMOGLOBIN A1C WITH EAG

## 2022-07-20 ENCOUNTER — OFFICE VISIT (OUTPATIENT)
Dept: INTERNAL MEDICINE CLINIC | Age: 62
End: 2022-07-20
Payer: COMMERCIAL

## 2022-07-20 VITALS
BODY MASS INDEX: 30.35 KG/M2 | HEIGHT: 70 IN | TEMPERATURE: 97 F | DIASTOLIC BLOOD PRESSURE: 83 MMHG | WEIGHT: 212 LBS | OXYGEN SATURATION: 98 % | HEART RATE: 68 BPM | RESPIRATION RATE: 16 BRPM | SYSTOLIC BLOOD PRESSURE: 124 MMHG

## 2022-07-20 DIAGNOSIS — R74.01 TRANSAMINASEMIA: ICD-10-CM

## 2022-07-20 DIAGNOSIS — K76.0 FATTY LIVER: Primary | ICD-10-CM

## 2022-07-20 DIAGNOSIS — E78.5 DYSLIPIDEMIA: ICD-10-CM

## 2022-07-20 DIAGNOSIS — R73.01 IFG (IMPAIRED FASTING GLUCOSE): ICD-10-CM

## 2022-07-20 DIAGNOSIS — I10 PRIMARY HYPERTENSION: ICD-10-CM

## 2022-07-20 DIAGNOSIS — R93.1 AGATSTON CAC SCORE 100-199: ICD-10-CM

## 2022-07-20 DIAGNOSIS — E66.9 OBESITY (BMI 30-39.9): ICD-10-CM

## 2022-07-20 PROCEDURE — 99214 OFFICE O/P EST MOD 30 MIN: CPT | Performed by: INTERNAL MEDICINE

## 2022-07-20 NOTE — PROGRESS NOTES
Keyur Box presents with   Chief Complaint   Patient presents with    Follow-up     6 month    Hypertension    Cholesterol Problem    Labs     7-13-22                1. \"Have you been to the ER, urgent care clinic since your last visit? Hospitalized since your last visit? \" No    2. \"Have you seen or consulted any other health care providers outside of the [address] [place] [address] since your last visit? \" No     3. For patients aged 39-70: Has the patient had a colonoscopy / FIT/ Cologuard?  Yes - no Care Gap present

## 2022-07-25 RX ORDER — LANSOPRAZOLE 30 MG/1
CAPSULE, DELAYED RELEASE ORAL
Qty: 90 CAPSULE | Refills: 3 | Status: SHIPPED | OUTPATIENT
Start: 2022-07-25

## 2022-09-06 ENCOUNTER — TRANSCRIBE ORDER (OUTPATIENT)
Dept: SCHEDULING | Age: 62
End: 2022-09-06

## 2022-09-06 DIAGNOSIS — Z86.010 PERSONAL HISTORY OF COLONIC POLYPS: ICD-10-CM

## 2022-09-06 DIAGNOSIS — K21.9 GASTROESOPHAGEAL REFLUX DISEASE: ICD-10-CM

## 2022-09-06 DIAGNOSIS — E66.8 OTHER OBESITY: ICD-10-CM

## 2022-09-06 DIAGNOSIS — R74.8 ABNORMAL LIVER ENZYMES: Primary | ICD-10-CM

## 2022-09-09 DIAGNOSIS — E78.5 DYSLIPIDEMIA: ICD-10-CM

## 2022-09-12 RX ORDER — ATORVASTATIN CALCIUM 10 MG/1
TABLET, FILM COATED ORAL
Qty: 90 TABLET | Refills: 3 | Status: SHIPPED | OUTPATIENT
Start: 2022-09-12

## 2022-09-16 ENCOUNTER — HOSPITAL ENCOUNTER (OUTPATIENT)
Dept: ULTRASOUND IMAGING | Age: 62
Discharge: HOME | End: 2022-09-16
Attending: INTERNAL MEDICINE
Payer: COMMERCIAL

## 2022-09-16 DIAGNOSIS — R74.8 ABNORMAL LIVER ENZYMES: ICD-10-CM

## 2022-09-16 DIAGNOSIS — K21.9 GASTROESOPHAGEAL REFLUX DISEASE: ICD-10-CM

## 2022-09-16 DIAGNOSIS — E66.8 OTHER OBESITY: ICD-10-CM

## 2022-09-16 DIAGNOSIS — Z86.010 PERSONAL HISTORY OF COLONIC POLYPS: ICD-10-CM

## 2022-09-16 PROCEDURE — 76981 USE PARENCHYMA: CPT

## 2022-11-27 RX ORDER — LOSARTAN POTASSIUM 50 MG/1
TABLET ORAL
Qty: 90 TABLET | Refills: 3 | Status: SHIPPED | OUTPATIENT
Start: 2022-11-27

## 2023-01-09 ENCOUNTER — APPOINTMENT (OUTPATIENT)
Dept: INTERNAL MEDICINE CLINIC | Age: 63
End: 2023-01-09

## 2023-01-09 ENCOUNTER — HOSPITAL ENCOUNTER (OUTPATIENT)
Dept: LAB | Age: 63
Discharge: HOME | End: 2023-01-09
Payer: COMMERCIAL

## 2023-01-09 DIAGNOSIS — E78.5 DYSLIPIDEMIA: ICD-10-CM

## 2023-01-09 DIAGNOSIS — R73.01 IFG (IMPAIRED FASTING GLUCOSE): ICD-10-CM

## 2023-01-09 LAB
ALBUMIN SERPL-MCNC: 4.4 G/DL (ref 3.4–5)
ALBUMIN/GLOB SERPL: 1.3 (ref 0.8–1.7)
ALP SERPL-CCNC: 83 U/L (ref 45–117)
ALT SERPL-CCNC: 104 U/L (ref 16–61)
ANION GAP SERPL CALC-SCNC: 7 MMOL/L (ref 3–18)
AST SERPL-CCNC: 53 U/L (ref 10–38)
BILIRUB SERPL-MCNC: 1.1 MG/DL (ref 0.2–1)
BUN SERPL-MCNC: 20 MG/DL (ref 7–18)
BUN/CREAT SERPL: 20 (ref 12–20)
CALCIUM SERPL-MCNC: 9.6 MG/DL (ref 8.5–10.1)
CHLORIDE SERPL-SCNC: 104 MMOL/L (ref 100–111)
CHOLEST SERPL-MCNC: 153 MG/DL
CO2 SERPL-SCNC: 28 MMOL/L (ref 21–32)
CREAT SERPL-MCNC: 1.02 MG/DL (ref 0.6–1.3)
EST. AVERAGE GLUCOSE BLD GHB EST-MCNC: 111 MG/DL
GLOBULIN SER CALC-MCNC: 3.4 G/DL (ref 2–4)
GLUCOSE SERPL-MCNC: 105 MG/DL (ref 74–99)
HBA1C MFR BLD: 5.5 % (ref 4.2–5.6)
HDLC SERPL-MCNC: 50 MG/DL (ref 40–60)
HDLC SERPL: 3.1 (ref 0–5)
LDLC SERPL CALC-MCNC: 83 MG/DL (ref 0–100)
LIPID PROFILE,FLP: NORMAL
POTASSIUM SERPL-SCNC: 4.1 MMOL/L (ref 3.5–5.5)
PROT SERPL-MCNC: 7.8 G/DL (ref 6.4–8.2)
SODIUM SERPL-SCNC: 139 MMOL/L (ref 136–145)
TRIGL SERPL-MCNC: 100 MG/DL (ref ?–150)
VLDLC SERPL CALC-MCNC: 20 MG/DL

## 2023-01-09 PROCEDURE — 80053 COMPREHEN METABOLIC PANEL: CPT

## 2023-01-09 PROCEDURE — 80061 LIPID PANEL: CPT

## 2023-01-09 PROCEDURE — 83036 HEMOGLOBIN GLYCOSYLATED A1C: CPT

## 2023-01-09 PROCEDURE — 36415 COLL VENOUS BLD VENIPUNCTURE: CPT

## 2023-01-10 NOTE — PROGRESS NOTES
57 y.o. WHITE/NON- male who presents for RPE    In the interim, he saw Dr Denny Johnson and elastography did show MCCARTHY w F1 fibrosis. He's working on the diet and trying to lose weight    No cardiovascular complaints. Bp controlled when he checks. He has done better with getting on the bike more often now    Diet wise, they are doing more fresh foods and his wife is trying out the pre-prepared foods to expedite healthy cooking. Needs to work on [employer] 1/18/2023 7/20/2022 7/20/2022 1/18/2022 6/8/2021   Weight 223 lb  212 lb 203 lb 205 lb     Denies gi or gu complaints. Past Medical History:   Diagnosis Date    Agatston CAC score 100-199 03/2019    ca score 181; calcifications left main and LAD    BPH with obstruction/lower urinary tract symptoms 08/2014    I-PSS 13    Colon adenomas 08/13/2020    Dr Denny Johnson 2 adenomas    Colon polyp 09/2010    Dr Stephanie Ramirez     COVID-19 virus infection 12/2021    Depression     Diastasis recti     Dyslipidemia     calculated 10 year risk score 7.6% (10/15); 6.4% (10/16); 8.7% (11/17); 13.5% (11/18); 13% (3/19)    ED (erectile dysfunction) 2019    Fatty liver     Us 8/14 negative serologies; Fib-4 was 0.87 from (7/14); [place] DR Denny Johnson; F1 on elastography 9/22    FHx: heart disease     GERD (gastroesophageal reflux disease)     EGD 10/14 Dr Krishan Ahuja    Hypertension 11/15/2018    IFG (impaired fasting glucose) 06/2021    Left-sided tinnitus     and hearing loss; saw Dr Daniele Stephenson 2010? Nephrolithiasis     Overweight(278.02)     peak weight 207 lbs, bmi 29.8 from 8/14; IF 1/19 start weight 203 lbs but unable to do; keto in past also    Pulmonary nodule 03/2019    1.4 cm calcified; no change, no f/u rec     Umbilical hernia     and ventral hernia     Past Surgical History:   Procedure Laterality Date    HX COLONOSCOPY      Dr Stephanie Ramirez polyp (9/10);  Dr Denny Johnson (8/13/20) divertics and adenomas     Social History     Socioeconomic History    Marital status:      Spouse name: Not on file    Number of children: 3    Years of education: Not on file    Highest education level: Not on file   Occupational History    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   Tobacco Use    Smoking status: Former    Smokeless tobacco: Former   Substance and Sexual Activity    Alcohol use: Yes     Comment: occasionally    Drug use: No    Sexual activity: Not on file   Other Topics Concern    Not on file   Social History Narrative    Not on file     Social Determinants of Health     Financial Resource Strain: Not on file   Food Insecurity: Not on file   Transportation Needs: Not on file   Physical Activity: Not on file   Stress: Not on file   Social Connections: Not on file   Intimate Partner Violence: Not on file   Housing Stability: Not on file     Current Outpatient Medications   Medication Sig    amLODIPine (NORVASC) 10 mg tablet Take 1 Tablet by mouth daily. losartan (COZAAR) 50 mg tablet take 1 tablet by mouth ONCE daily    atorvastatin (LIPITOR) 10 mg tablet take 1 tablet by mouth once daily    lansoprazole (PREVACID) 30 mg capsule take 1 capsule by mouth every morning before breakfast     No current facility-administered medications for this visit.      Allergies   Allergen Reactions    Aleve [Naproxen Sodium] Rash    Benazepril Cough     REVIEW OF SYSTEMS: colo 8/20 Dr Armida Cortez  Ophtho - no vision change or eye pain  Oral - no mouth pain, tongue or tooth problems  Ears - no hearing loss, ear pain, fullness, no swallowing problems  Cardiac - no CP, PND, orthopnea, edema, palpitations or syncope  Chest - no breast masses  Resp - no wheezing, chronic coughing, dyspnea  Genitals - no genital lesions, discharge, masses, ulceration, warts  Ortho - no swelling, dec ROM, myalgias  Derm - no nail abnormalities, rashes, lesions of note, hair loss    Visit Vitals  /75   Pulse 79   Temp 98.1 °F (36.7 °C) (Temporal)   Resp 19   Ht 5' 10.25\" (1.784 m)   Wt 223 lb (101.2 kg)   SpO2 98%   BMI 31.77 kg/m²   A&O x3  Affect is appropriate. Mood stable  No apparent distress  Anicteric, no JVD, adenopathy or thyromegaly. No carotid bruits or radiated murmur  Lungs clear to auscultation, no wheezes or rales  Heart showed regular rate and rhythm. No murmur, rubs, gallops  Abdomen soft nontender, no hepatosplenomegaly or masses. Extremities without edema.   Pulses 1-2+ symmetrically  Rectal showed guaiac neg brown stool normal tone  Prostate about 30 gm without asymmetry nodularity or tenderness    LABS  From 7/10 showed   gluc 83, cr 0.90, gfr>60, alt 63,         chol 156, tg 67,   hdl 46, ldl-c 97,   wbc 5.3, hb 16.1, plt 198, tsh 1.67, psa 0.60  From 7/14 showed   gluc 88, cr 1.04, gfr>60, alt 57, hba1c 5.3, chol 187, tg 194, hdl 43, ldl-c 105, wbc 7.1, hb 17.6, plt 207, tsh 1.80, psa 0.90, ua neg  From 8/14 showed                 alt 71,                                  ast 34, ap 83, tb 0.6, hep b/c neg, bhavin neg, fe 101, %sat 31, ferritin 241, cerulo 17.5  From 2/15 showed   gluc 96, cr 1.02, gfr>60, alt 37  From 10/15 showed gluc 92, cr 0.95, gfr>60, alt 27,         chol 185, tg 104, hdl 44, ldl-c 120  From 10/16 showed gluc 92, cr 1.07, gfr>60, alt 62,         chol 194, tg 47,   hdl 59, ldl-c 126, wbc 5.4, hb 16.7, plt 185,              psa 0.80, ua neg  From 10/17 showed gluc 94, cr 0.99, gfr>60, alt 87,         chol 188, tg 91,   hdl 53, ldl-c 117, wbc 6.6, hb 16.9, plt 257,              psa 1.40, ast 30, ap 94, tb 0.9  From 11/18 showed gluc 96, cr 1.03, gfr>60, alt 74,         chol 193, tg 81,   hdl 46, ldl-c 131, wbc 6.0, hb 16.5, plt 207    From 3/19 showed   gluc 90, cr 1.02, gfr>60,          chol 200, tg 193, hdl 43, ldl-c 118,                            psa 1.00, ua neg  From 11/19 showed gluc 99, cr 0.96, gfr>60, alt 110,          chol 136, tg 79,   hdl 54, ldl-c 66,            ast 40, ap 86, tb 0.9,  From 5/20 showed   gluc 90, cr 0.88, gfr>60, alt 88,         chol 142, tg 89,   hdl 56, ldl-c 68,   wbc 7.0, hb 17.2, plt 234,              psa 1.00, ast 37, ap 75, tb 1.0  From 12/20 showed glu 101, cr 1.00, gfr>60  From 6/21 showed   glu 106, cr 0.97, gfr>60, alt 89, hba1c 5.1, chol 147, tg 89,   hdl 53, ldl-c 77,   wbc 7.1, hb 16.4, plt 256,              psa 1.20, ast 37, ap 88, tb 0.8  From 1/22 showed   gluc 92, cr 0.93, gfr>60,   hba1c 5.4  From 7/22 showed   glu 102, cr 0.99, gfr>60, alt 99, hba1c 5.4, chol 145, tg 89,   hdl 51, ldl-c 77,   wbc 6.2, hb 16.3, plt 215,   psa 1.30, ast 49, ap 90, tb 0.80    Results for orders placed or performed during the hospital encounter of 29/99/42   METABOLIC PANEL, COMPREHENSIVE   Result Value Ref Range    Sodium 139 136 - 145 mmol/L    Potassium 4.1 3.5 - 5.5 mmol/L    Chloride 104 100 - 111 mmol/L    CO2 28 21 - 32 mmol/L    Anion gap 7 3.0 - 18 mmol/L    Glucose 105 (H) 74 - 99 mg/dL    BUN 20 (H) 7.0 - 18 MG/DL    Creatinine 1.02 0.6 - 1.3 MG/DL    BUN/Creatinine ratio 20 12 - 20      eGFR >60 >60 ml/min/1.73m2    Calcium 9.6 8.5 - 10.1 MG/DL    Bilirubin, total 1.1 (H) 0.2 - 1.0 MG/DL    ALT (SGPT) 104 (H) 16 - 61 U/L    AST (SGOT) 53 (H) 10 - 38 U/L    Alk.  phosphatase 83 45 - 117 U/L    Protein, total 7.8 6.4 - 8.2 g/dL    Albumin 4.4 3.4 - 5.0 g/dL    Globulin 3.4 2.0 - 4.0 g/dL    A-G Ratio 1.3 0.8 - 1.7     HEMOGLOBIN A1C WITH EAG   Result Value Ref Range    Hemoglobin A1c 5.5 4.2 - 5.6 %    Est. average glucose 111 mg/dL   LIPID PANEL   Result Value Ref Range    LIPID PROFILE          Cholesterol, total 153 <200 MG/DL    Triglyceride 100 <150 MG/DL    HDL Cholesterol 50 40 - 60 MG/DL    LDL, calculated 83 0 - 100 MG/DL    VLDL, calculated 20 MG/DL    CHOL/HDL Ratio 3.1 0 - 5.0       We reviewed the patient's labs from the last several visits to point out trends in the numbers        Patient Active Problem List   Diagnosis Code    Dyslipidemia E78.5    Gastroesophageal reflux disease without esophagitis K21.9    BPH with obstruction/lower urinary tract symptoms N40.1, N13.8    Primary hypertension I10    Erectile dysfunction N52.9    Agatston CAC score 100-199 R93.1    Colon adenoma D12.6    IFG (impaired fasting glucose) R73.01    Obesity (BMI 30-39. 9) E66.9    KELLER (nonalcoholic steatohepatitis) K75.81     Assessment and plan:  1. GERD. Continue ppi and avoidance measures  2. Dyslipidemia. Continue statin and he will work on exercise and diet as above  3. BPH/LUTS. Observation  4. KELLER. Very long discussion about this condition, potential for progression to cirrhosis, etc.  Focus on wt loss  5. Polyp. Fiber, colo 2025  6. HTN. Controlled on current   7. Obesity Lifestyle and dietary measures as above,  went over the oral and injectable options for kyree supp, including risks, benefits, cost issues. He will do some research and call us back if interested  8. IFG  Lifestyle and dietary measures. Portion control reiterated. Wt loss as above        RTC 7/23    Above conditions discussed at length and patient vocalized understanding. All questions answered to patient satisfaction        ICD-10-CM ICD-9-CM    1. Physical exam  Z00.00 V70.9 CBC W/O DIFF      METABOLIC PANEL, COMPREHENSIVE      PSA SCREENING (SCREENING)      HEMOGLOBIN A1C WITH EAG      2. Primary hypertension  I10 401.9 amLODIPine (NORVASC) 10 mg tablet      CBC W/O DIFF      3. KELLER (nonalcoholic steatohepatitis)  K75.81 571.8       4. Agatston CAC score 100-199  R93.1 793.2       5. Colon adenoma  D12.6 211.3 AMB POC FECAL BLOOD, OCCULT, QL 1 CARD      6. IFG (impaired fasting glucose)  F15.56 866.34 METABOLIC PANEL, COMPREHENSIVE      HEMOGLOBIN A1C WITH EAG      7. Obesity (BMI 30-39. 9)  E66.9 278.00       8.  Dyslipidemia  E78.5 272.4

## 2023-01-16 NOTE — PROGRESS NOTES
Stephanie Barker presents today for   Chief Complaint   Patient presents with    Follow-up    Labs     1-9-23    Fatty Liver    Cholesterol Problem     Dyslipidemia    Hypertension     Primay    Blood sugar problem     IFG     1. \"Have you been to the ER, urgent care clinic since your last visit? Hospitalized since your last visit? \" no    2. \"Have you seen or consulted any other health care providers outside of the [address] [place] [address] since your last visit? \" no     3. For patients aged 39-70: Has the patient had a colonoscopy / FIT/ Cologuard? Yes - no Care Gap present      If the patient is female:    4. For patients aged 41-77: Has the patient had a mammogram within the past 2 years? NA - based on age or sex  See top three    5. For patients aged 21-65: Has the patient had a pap smear?  NA - based on age or sex

## 2023-01-18 ENCOUNTER — OFFICE VISIT (OUTPATIENT)
Dept: INTERNAL MEDICINE CLINIC | Age: 63
End: 2023-01-18
Payer: COMMERCIAL

## 2023-01-18 VITALS
BODY MASS INDEX: 31.92 KG/M2 | WEIGHT: 223 LBS | TEMPERATURE: 98.1 F | HEIGHT: 70 IN | HEART RATE: 79 BPM | SYSTOLIC BLOOD PRESSURE: 120 MMHG | RESPIRATION RATE: 19 BRPM | DIASTOLIC BLOOD PRESSURE: 75 MMHG | OXYGEN SATURATION: 98 %

## 2023-01-18 DIAGNOSIS — E78.5 DYSLIPIDEMIA: ICD-10-CM

## 2023-01-18 DIAGNOSIS — E66.9 OBESITY (BMI 30-39.9): ICD-10-CM

## 2023-01-18 DIAGNOSIS — R93.1 AGATSTON CAC SCORE 100-199: ICD-10-CM

## 2023-01-18 DIAGNOSIS — I10 PRIMARY HYPERTENSION: ICD-10-CM

## 2023-01-18 DIAGNOSIS — D12.6 COLON ADENOMA: ICD-10-CM

## 2023-01-18 DIAGNOSIS — K75.81 NASH (NONALCOHOLIC STEATOHEPATITIS): ICD-10-CM

## 2023-01-18 DIAGNOSIS — Z00.00 PHYSICAL EXAM: Primary | ICD-10-CM

## 2023-01-18 DIAGNOSIS — R73.01 IFG (IMPAIRED FASTING GLUCOSE): ICD-10-CM

## 2023-01-18 PROBLEM — K76.0 FATTY LIVER: Status: RESOLVED | Noted: 2017-11-07 | Resolved: 2023-01-18

## 2023-01-18 LAB
HEMOCCULT STL QL: NEGATIVE
VALID INTERNAL CONTROL?: YES

## 2023-01-18 PROCEDURE — 99396 PREV VISIT EST AGE 40-64: CPT | Performed by: INTERNAL MEDICINE

## 2023-01-18 PROCEDURE — 3078F DIAST BP <80 MM HG: CPT | Performed by: INTERNAL MEDICINE

## 2023-01-18 PROCEDURE — 82270 OCCULT BLOOD FECES: CPT | Performed by: INTERNAL MEDICINE

## 2023-01-18 PROCEDURE — 3074F SYST BP LT 130 MM HG: CPT | Performed by: INTERNAL MEDICINE

## 2023-01-18 RX ORDER — AMLODIPINE BESYLATE 10 MG/1
10 TABLET ORAL DAILY
Qty: 90 TABLET | Refills: 3 | Status: SHIPPED | OUTPATIENT
Start: 2023-01-18

## 2023-02-05 DIAGNOSIS — Z00.00 PHYSICAL EXAM: ICD-10-CM

## 2023-02-05 DIAGNOSIS — I10 PRIMARY HYPERTENSION: Primary | ICD-10-CM

## 2023-02-05 DIAGNOSIS — Z00.00 PHYSICAL EXAM: Primary | ICD-10-CM

## 2023-02-05 DIAGNOSIS — R73.01 IFG (IMPAIRED FASTING GLUCOSE): Primary | ICD-10-CM

## 2023-04-25 ENCOUNTER — TELEPHONE (OUTPATIENT)
Age: 63
End: 2023-04-25

## 2023-04-25 NOTE — TELEPHONE ENCOUNTER
Prior Auth approval for Lansoprazole 30 mg     Received Prior [employer] for (03/26/2023).   Coverage period (04/24/2024)

## 2023-07-19 ENCOUNTER — HOSPITAL ENCOUNTER (OUTPATIENT)
Facility: HOSPITAL | Age: 63
Setting detail: SPECIMEN
Discharge: HOME | End: 2023-07-22
Payer: COMMERCIAL

## 2023-07-19 DIAGNOSIS — Z00.00 PHYSICAL EXAM: ICD-10-CM

## 2023-07-19 DIAGNOSIS — R73.01 IFG (IMPAIRED FASTING GLUCOSE): ICD-10-CM

## 2023-07-19 DIAGNOSIS — I10 PRIMARY HYPERTENSION: ICD-10-CM

## 2023-07-19 LAB
ALBUMIN SERPL-MCNC: 4.1 G/DL (ref 3.4–5)
ALBUMIN/GLOB SERPL: 1.2 (ref 0.8–1.7)
ALP SERPL-CCNC: 94 U/L (ref 45–117)
ALT SERPL-CCNC: 67 U/L (ref 16–61)
ANION GAP SERPL CALC-SCNC: 4 MMOL/L (ref 3–18)
AST SERPL-CCNC: 34 U/L (ref 10–38)
BILIRUB SERPL-MCNC: 1.2 MG/DL (ref 0.2–1)
BUN SERPL-MCNC: 19 MG/DL (ref 7–18)
BUN/CREAT SERPL: 20 (ref 12–20)
CALCIUM SERPL-MCNC: 9.5 MG/DL (ref 8.5–10.1)
CHLORIDE SERPL-SCNC: 105 MMOL/L (ref 100–111)
CO2 SERPL-SCNC: 30 MMOL/L (ref 21–32)
CREAT SERPL-MCNC: 0.95 MG/DL (ref 0.6–1.3)
ERYTHROCYTE [DISTWIDTH] IN BLOOD BY AUTOMATED COUNT: 12.6 % (ref 11.6–14.5)
GLOBULIN SER CALC-MCNC: 3.3 G/DL (ref 2–4)
GLUCOSE SERPL-MCNC: 86 MG/DL (ref 74–99)
HCT VFR BLD AUTO: 46.6 % (ref 36–48)
HGB BLD-MCNC: 16.4 G/DL (ref 13–16)
MCH RBC QN AUTO: 32.8 PG (ref 24–34)
MCHC RBC AUTO-ENTMCNC: 35.2 G/DL (ref 31–37)
MCV RBC AUTO: 93.2 FL (ref 78–100)
NRBC # BLD: 0 K/UL (ref 0–0.01)
NRBC BLD-RTO: 0 PER 100 WBC
PLATELET # BLD AUTO: 234 K/UL (ref 135–420)
PMV BLD AUTO: 10.3 FL (ref 9.2–11.8)
POTASSIUM SERPL-SCNC: 4 MMOL/L (ref 3.5–5.5)
PROT SERPL-MCNC: 7.4 G/DL (ref 6.4–8.2)
PSA SERPL-MCNC: 1.6 NG/ML (ref 0–4)
RBC # BLD AUTO: 5 M/UL (ref 4.35–5.65)
SODIUM SERPL-SCNC: 139 MMOL/L (ref 136–145)
WBC # BLD AUTO: 10.1 K/UL (ref 4.6–13.2)

## 2023-07-19 PROCEDURE — G0103 PSA SCREENING: HCPCS

## 2023-07-19 PROCEDURE — 80053 COMPREHEN METABOLIC PANEL: CPT

## 2023-07-19 PROCEDURE — 36415 COLL VENOUS BLD VENIPUNCTURE: CPT

## 2023-07-19 PROCEDURE — 85027 COMPLETE CBC AUTOMATED: CPT

## 2023-07-26 ENCOUNTER — OFFICE VISIT (OUTPATIENT)
Age: 63
End: 2023-07-26
Payer: COMMERCIAL

## 2023-07-26 VITALS
BODY MASS INDEX: 30.49 KG/M2 | HEART RATE: 84 BPM | WEIGHT: 213 LBS | HEIGHT: 70 IN | TEMPERATURE: 98.2 F | RESPIRATION RATE: 18 BRPM | SYSTOLIC BLOOD PRESSURE: 127 MMHG | DIASTOLIC BLOOD PRESSURE: 78 MMHG | OXYGEN SATURATION: 99 %

## 2023-07-26 DIAGNOSIS — E78.5 DYSLIPIDEMIA: ICD-10-CM

## 2023-07-26 DIAGNOSIS — Z23 ENCOUNTER FOR IMMUNIZATION: ICD-10-CM

## 2023-07-26 DIAGNOSIS — R73.01 IFG (IMPAIRED FASTING GLUCOSE): ICD-10-CM

## 2023-07-26 DIAGNOSIS — Z00.00 PHYSICAL EXAM: Primary | ICD-10-CM

## 2023-07-26 DIAGNOSIS — R93.1 AGATSTON CAC SCORE 100-199: ICD-10-CM

## 2023-07-26 DIAGNOSIS — G47.33 OSA (OBSTRUCTIVE SLEEP APNEA): ICD-10-CM

## 2023-07-26 DIAGNOSIS — D75.1 ERYTHROCYTOSIS: ICD-10-CM

## 2023-07-26 DIAGNOSIS — D12.6 COLON ADENOMA: ICD-10-CM

## 2023-07-26 DIAGNOSIS — I10 PRIMARY HYPERTENSION: ICD-10-CM

## 2023-07-26 DIAGNOSIS — K75.81 NASH (NONALCOHOLIC STEATOHEPATITIS): ICD-10-CM

## 2023-07-26 DIAGNOSIS — N52.9 ERECTILE DYSFUNCTION, UNSPECIFIED ERECTILE DYSFUNCTION TYPE: ICD-10-CM

## 2023-07-26 DIAGNOSIS — E66.9 OBESITY (BMI 30-39.9): ICD-10-CM

## 2023-07-26 PROCEDURE — 3078F DIAST BP <80 MM HG: CPT | Performed by: INTERNAL MEDICINE

## 2023-07-26 PROCEDURE — 90471 IMMUNIZATION ADMIN: CPT | Performed by: INTERNAL MEDICINE

## 2023-07-26 PROCEDURE — 90677 PCV20 VACCINE IM: CPT | Performed by: INTERNAL MEDICINE

## 2023-07-26 PROCEDURE — 99397 PER PM REEVAL EST PAT 65+ YR: CPT | Performed by: INTERNAL MEDICINE

## 2023-07-26 PROCEDURE — 3074F SYST BP LT 130 MM HG: CPT | Performed by: INTERNAL MEDICINE

## 2023-07-26 RX ORDER — SILDENAFIL 100 MG/1
100 TABLET, FILM COATED ORAL DAILY PRN
Qty: 10 TABLET | Refills: 5 | Status: SHIPPED | OUTPATIENT
Start: 2023-07-26

## 2023-07-26 SDOH — ECONOMIC STABILITY: INCOME INSECURITY: HOW HARD IS IT FOR YOU TO PAY FOR THE VERY BASICS LIKE FOOD, HOUSING, MEDICAL CARE, AND HEATING?: NOT HARD AT ALL

## 2023-07-26 SDOH — ECONOMIC STABILITY: FOOD INSECURITY: WITHIN THE PAST 12 MONTHS, THE FOOD YOU BOUGHT JUST DIDN'T LAST AND YOU DIDN'T HAVE MONEY TO GET MORE.: NEVER TRUE

## 2023-07-26 SDOH — ECONOMIC STABILITY: HOUSING INSECURITY
IN THE LAST 12 MONTHS, WAS THERE A TIME WHEN YOU DID NOT HAVE A STEADY PLACE TO SLEEP OR SLEPT IN A SHELTER (INCLUDING NOW)?: NO

## 2023-07-26 SDOH — ECONOMIC STABILITY: FOOD INSECURITY: WITHIN THE PAST 12 MONTHS, YOU WORRIED THAT YOUR FOOD WOULD RUN OUT BEFORE YOU GOT MONEY TO BUY MORE.: NEVER TRUE

## 2023-07-26 ASSESSMENT — PATIENT HEALTH QUESTIONNAIRE - PHQ9
2. FEELING DOWN, DEPRESSED OR HOPELESS: 0
SUM OF ALL RESPONSES TO PHQ9 QUESTIONS 1 & 2: 0
SUM OF ALL RESPONSES TO PHQ QUESTIONS 1-9: 0
1. LITTLE INTEREST OR PLEASURE IN DOING THINGS: 0
SUM OF ALL RESPONSES TO PHQ QUESTIONS 1-9: 0

## 2023-07-26 ASSESSMENT — LIFESTYLE VARIABLES
HOW MANY STANDARD DRINKS CONTAINING ALCOHOL DO YOU HAVE ON A TYPICAL DAY: 1 OR 2
HAS A RELATIVE, FRIEND, DOCTOR, OR ANOTHER HEALTH PROFESSIONAL EXPRESSED CONCERN ABOUT YOUR DRINKING OR SUGGESTED YOU CUT DOWN: 4
HOW OFTEN DURING THE LAST YEAR HAVE YOU HAD A FEELING OF GUILT OR REMORSE AFTER DRINKING: 3
HOW OFTEN DURING THE LAST YEAR HAVE YOU NEEDED AN ALCOHOLIC DRINK FIRST THING IN THE MORNING TO GET YOURSELF GOING AFTER A NIGHT OF HEAVY DRINKING: 0
HOW OFTEN DURING THE LAST YEAR HAVE YOU FOUND THAT YOU WERE NOT ABLE TO STOP DRINKING ONCE YOU HAD STARTED: 0
HOW OFTEN DO YOU HAVE A DRINK CONTAINING ALCOHOL: 4 OR MORE TIMES A WEEK
HAVE YOU OR SOMEONE ELSE BEEN INJURED AS A RESULT OF YOUR DRINKING: 0
HOW OFTEN DURING THE LAST YEAR HAVE YOU BEEN UNABLE TO REMEMBER WHAT HAPPENED THE NIGHT BEFORE BECAUSE YOU HAD BEEN DRINKING: 0
HOW OFTEN DURING THE LAST YEAR HAVE YOU FAILED TO DO WHAT WAS NORMALLY EXPECTED FROM YOU BECAUSE OF DRINKING: 0

## 2023-08-01 RX ORDER — LANSOPRAZOLE 30 MG/1
CAPSULE, DELAYED RELEASE ORAL
Qty: 90 CAPSULE | Refills: 3 | Status: SHIPPED | OUTPATIENT
Start: 2023-08-01

## 2023-08-23 ENCOUNTER — HOSPITAL ENCOUNTER (OUTPATIENT)
Facility: HOSPITAL | Age: 63
Discharge: HOME | End: 2023-08-26
Payer: COMMERCIAL

## 2023-08-23 LAB
FERRITIN SERPL-MCNC: 296 NG/ML (ref 8–388)
FOLATE SERPL-MCNC: 12 NG/ML (ref 3.1–17.5)
VIT B12 SERPL-MCNC: 379 PG/ML (ref 211–911)

## 2023-08-23 PROCEDURE — 82746 ASSAY OF FOLIC ACID SERUM: CPT

## 2023-08-23 PROCEDURE — 82728 ASSAY OF FERRITIN: CPT

## 2023-08-23 PROCEDURE — 36415 COLL VENOUS BLD VENIPUNCTURE: CPT

## 2023-08-23 PROCEDURE — 82607 VITAMIN B-12: CPT

## 2023-08-31 NOTE — TELEPHONE ENCOUNTER
PCP:  Ray Schimtz MD    Last refill per chart:  atorvastatin (LIPITOR) 10 mg tablet 90 Tablet 3 9/12/2022     Sig: take 1 tablet by mouth once daily    Sent to pharmacy as: atorvastatin 10 mg tablet (LIPITOR)    E-Prescribing Status: Receipt confirmed by pharmacy (9/12/2022  2:29 PM EDT)        Future Appointments   Date Time Provider [address]  [address]   1/23/2024 10:10 AM [facility] LAB VISIT [facility] AMB   1/30/2024  9:40 AM Ray Schmitz MD [facility] BS AMB

## 2023-09-05 RX ORDER — ATORVASTATIN CALCIUM 10 MG/1
TABLET, FILM COATED ORAL
Qty: 90 TABLET | Refills: 3 | Status: SHIPPED | OUTPATIENT
Start: 2023-09-05

## 2023-10-20 DIAGNOSIS — G47.33 OSA (OBSTRUCTIVE SLEEP APNEA): Primary | ICD-10-CM

## 2023-11-22 RX ORDER — LOSARTAN POTASSIUM 50 MG/1
TABLET ORAL
Qty: 90 TABLET | Refills: 3 | Status: SHIPPED | OUTPATIENT
Start: 2023-11-22

## 2024-01-23 ENCOUNTER — HOSPITAL ENCOUNTER (OUTPATIENT)
Facility: HOSPITAL | Age: 64
Setting detail: SPECIMEN
Discharge: HOME | End: 2024-01-26
Payer: COMMERCIAL

## 2024-01-23 DIAGNOSIS — R73.01 IFG (IMPAIRED FASTING GLUCOSE): ICD-10-CM

## 2024-01-23 DIAGNOSIS — D75.1 ERYTHROCYTOSIS: ICD-10-CM

## 2024-01-23 DIAGNOSIS — K75.81 NASH (NONALCOHOLIC STEATOHEPATITIS): ICD-10-CM

## 2024-01-23 LAB
ALBUMIN SERPL-MCNC: 4.7 G/DL (ref 3.4–5)
ALBUMIN/GLOB SERPL: 1.4 (ref 0.8–1.7)
ALP SERPL-CCNC: 87 U/L (ref 45–117)
ALT SERPL-CCNC: 111 U/L (ref 16–61)
ANION GAP SERPL CALC-SCNC: 3 MMOL/L (ref 3–18)
AST SERPL-CCNC: 42 U/L (ref 10–38)
BASOPHILS # BLD: 0 K/UL (ref 0–0.1)
BASOPHILS NFR BLD: 0 % (ref 0–2)
BILIRUB SERPL-MCNC: 1.6 MG/DL (ref 0.2–1)
BUN SERPL-MCNC: 23 MG/DL (ref 7–18)
BUN/CREAT SERPL: 21 (ref 12–20)
CALCIUM SERPL-MCNC: 9.6 MG/DL (ref 8.5–10.1)
CHLORIDE SERPL-SCNC: 106 MMOL/L (ref 100–111)
CO2 SERPL-SCNC: 30 MMOL/L (ref 21–32)
CREAT SERPL-MCNC: 1.07 MG/DL (ref 0.6–1.3)
DIFFERENTIAL METHOD BLD: ABNORMAL
EOSINOPHIL # BLD: 0.2 K/UL (ref 0–0.4)
EOSINOPHIL NFR BLD: 2 % (ref 0–5)
ERYTHROCYTE [DISTWIDTH] IN BLOOD BY AUTOMATED COUNT: 12.5 % (ref 11.6–14.5)
GLOBULIN SER CALC-MCNC: 3.3 G/DL (ref 2–4)
GLUCOSE SERPL-MCNC: 99 MG/DL (ref 74–99)
HCT VFR BLD AUTO: 49.9 % (ref 36–48)
HGB BLD-MCNC: 17 G/DL (ref 13–16)
IMM GRANULOCYTES # BLD AUTO: 0 K/UL (ref 0–0.04)
IMM GRANULOCYTES NFR BLD AUTO: 0 % (ref 0–0.5)
LYMPHOCYTES # BLD: 2.2 K/UL (ref 0.9–3.6)
LYMPHOCYTES NFR BLD: 25 % (ref 21–52)
MCH RBC QN AUTO: 33.2 PG (ref 24–34)
MCHC RBC AUTO-ENTMCNC: 34.1 G/DL (ref 31–37)
MCV RBC AUTO: 97.5 FL (ref 78–100)
MONOCYTES # BLD: 0.7 K/UL (ref 0.05–1.2)
MONOCYTES NFR BLD: 8 % (ref 3–10)
NEUTS SEG # BLD: 5.6 K/UL (ref 1.8–8)
NEUTS SEG NFR BLD: 64 % (ref 40–73)
NRBC # BLD: 0 K/UL (ref 0–0.01)
NRBC BLD-RTO: 0 PER 100 WBC
PLATELET # BLD AUTO: 236 K/UL (ref 135–420)
PMV BLD AUTO: 10.4 FL (ref 9.2–11.8)
POTASSIUM SERPL-SCNC: 3.9 MMOL/L (ref 3.5–5.5)
PROT SERPL-MCNC: 8 G/DL (ref 6.4–8.2)
RBC # BLD AUTO: 5.12 M/UL (ref 4.35–5.65)
SODIUM SERPL-SCNC: 139 MMOL/L (ref 136–145)
WBC # BLD AUTO: 8.8 K/UL (ref 4.6–13.2)

## 2024-01-23 PROCEDURE — 80053 COMPREHEN METABOLIC PANEL: CPT

## 2024-01-23 PROCEDURE — 85025 COMPLETE CBC W/AUTO DIFF WBC: CPT

## 2024-01-23 PROCEDURE — 36415 COLL VENOUS BLD VENIPUNCTURE: CPT

## 2024-01-30 ENCOUNTER — OFFICE VISIT (OUTPATIENT)
Age: 64
End: 2024-01-30
Payer: COMMERCIAL

## 2024-01-30 VITALS
HEART RATE: 80 BPM | TEMPERATURE: 97.5 F | WEIGHT: 224 LBS | BODY MASS INDEX: 32.07 KG/M2 | SYSTOLIC BLOOD PRESSURE: 130 MMHG | OXYGEN SATURATION: 97 % | DIASTOLIC BLOOD PRESSURE: 77 MMHG | HEIGHT: 70 IN | RESPIRATION RATE: 16 BRPM

## 2024-01-30 DIAGNOSIS — D12.6 COLON ADENOMA: ICD-10-CM

## 2024-01-30 DIAGNOSIS — R93.1 AGATSTON CAC SCORE 100-199: ICD-10-CM

## 2024-01-30 DIAGNOSIS — D75.1 ERYTHROCYTOSIS: Primary | ICD-10-CM

## 2024-01-30 DIAGNOSIS — Z12.5 SCREENING FOR MALIGNANT NEOPLASM OF PROSTATE: ICD-10-CM

## 2024-01-30 DIAGNOSIS — E78.5 DYSLIPIDEMIA: ICD-10-CM

## 2024-01-30 DIAGNOSIS — K75.81 NASH (NONALCOHOLIC STEATOHEPATITIS): ICD-10-CM

## 2024-01-30 DIAGNOSIS — I10 PRIMARY HYPERTENSION: ICD-10-CM

## 2024-01-30 DIAGNOSIS — R73.01 IFG (IMPAIRED FASTING GLUCOSE): ICD-10-CM

## 2024-01-30 PROCEDURE — 3075F SYST BP GE 130 - 139MM HG: CPT | Performed by: INTERNAL MEDICINE

## 2024-01-30 PROCEDURE — 99214 OFFICE O/P EST MOD 30 MIN: CPT | Performed by: INTERNAL MEDICINE

## 2024-01-30 PROCEDURE — 1123F ACP DISCUSS/DSCN MKR DOCD: CPT | Performed by: INTERNAL MEDICINE

## 2024-01-30 PROCEDURE — 3078F DIAST BP <80 MM HG: CPT | Performed by: INTERNAL MEDICINE

## 2024-01-30 NOTE — PROGRESS NOTES
Gordon Barnettnigan presents today for   Chief Complaint   Patient presents with    Follow-up     6-month       1. \"Have you been to the ER, urgent care clinic since your last visit?  Hospitalized since your last visit?\" No    2. \"Have you seen or consulted any other health care providers outside of the [facility] since your last visit?\" No     3. For patients aged 45-75: Has the patient had a colonoscopy / FIT/ Cologuard? No      If the patient is female:    4. For patients aged 40-74: Has the patient had a mammogram within the past 2 years? NA - based on age or sex      5. For patients aged 21-65: Has the patient had a pap smear? NA - based on age or sex    
Dr Mckeon  Focus on wt loss  5. IFG  Lifestyle and dietary measures.  Portion control reiterated.  Wt loss as above  6. HTN.  Controlled on current   7. Obesity Lifestyle and dietary measures as above, not interested in [place] supp    NEW ISSUES  8. Erythrocytosis.  Did not improve with maria c tx.  Quick discussion, will send to hematology  9. MARIA C.  Doing well and follow up Dr Fay  10.  Colon adenoma.  Recent episodes BRBPR.  Will send back to Dr Mckeon's group       RTC 1/24    Above conditions discussed at length and patient vocalized understanding.  All questions answered to patient satisfaction     Diagnosis Orders   1. Erythrocytosis  External Referral To Hematology Oncology      2. Agatston CAC score 100-199        3. Primary hypertension        4. Colon adenoma        5. BANEGAS (nonalcoholic steatohepatitis)        6. IFG (impaired fasting glucose)  Comprehensive Metabolic Panel    HEMOGLOBIN A1C W/O EAG      7. Dyslipidemia  Lipid Panel      8. Screening for malignant neoplasm of prostate  PSA Screening

## 2024-02-20 ENCOUNTER — TELEMEDICINE (OUTPATIENT)
Age: 64
End: 2024-02-20
Payer: COMMERCIAL

## 2024-02-20 ENCOUNTER — TELEPHONE (OUTPATIENT)
Age: 64
End: 2024-02-20

## 2024-02-20 DIAGNOSIS — R05.1 ACUTE COUGH: Primary | ICD-10-CM

## 2024-02-20 DIAGNOSIS — J06.9 UPPER RESPIRATORY TRACT INFECTION, UNSPECIFIED TYPE: ICD-10-CM

## 2024-02-20 PROCEDURE — 1123F ACP DISCUSS/DSCN MKR DOCD: CPT | Performed by: INTERNAL MEDICINE

## 2024-02-20 PROCEDURE — 99213 OFFICE O/P EST LOW 20 MIN: CPT | Performed by: INTERNAL MEDICINE

## 2024-02-20 RX ORDER — CODEINE PHOSPHATE/GUAIFENESIN 10-100MG/5
5 LIQUID (ML) ORAL 3 TIMES DAILY PRN
Qty: 100 ML | Refills: 0 | Status: SHIPPED | OUTPATIENT
Start: 2024-02-20 | End: 2024-02-27

## 2024-02-20 NOTE — PROGRESS NOTES
plan.     Gordon Malik   was evaluated through a synchronous (real-time) audio-video encounter. The patient (or guardian if applicable) is aware that this is a billable service, which includes applicable co-pays. This Virtual Visit was conducted with patient's (and/or legal guardian's) consent. The visit was conducted pursuant to the emergency declaration under the [place] Act and the National Emergencies Act, 1135 waiver authority and the Coronavirus Preparedness and Response Supplemental Appropriations Act.  Patient identification was verified, and a caregiver was present when appropriate.  The patient was located at: Home: [address] [address]  The provider was located at: Facility (Appt Dept): [address], [address]  [place] [address]    Assessment and plan:  1.  URI.  Probably viral at this point.  Check for COVID, OTC meds for symptom relief, Robitussin AC for cough suppression.  He will call if progressive symptoms.        Jim Cornejo MD

## 2024-02-20 NOTE — TELEPHONE ENCOUNTER
Pt having deep cough  ,pt said his wife has the same cough  and tested negative for covid  , he did not test , he said the cough is is only symptom . He is asking for a VV today

## 2024-02-27 ENCOUNTER — HOSPITAL ENCOUNTER (OUTPATIENT)
Facility: HOSPITAL | Age: 64
Discharge: HOME | End: 2024-03-01
Payer: COMMERCIAL

## 2024-02-27 DIAGNOSIS — J44.9 VANISHING LUNG (HCC): ICD-10-CM

## 2024-02-27 DIAGNOSIS — J44.9 CHRONIC OBSTRUCTIVE PULMONARY DISEASE, UNSPECIFIED COPD TYPE (HCC): ICD-10-CM

## 2024-02-27 PROCEDURE — 74177 CT ABD & PELVIS W/CONTRAST: CPT

## 2024-02-27 PROCEDURE — 71046 X-RAY EXAM CHEST 2 VIEWS: CPT

## 2024-02-27 PROCEDURE — 6360000004 HC RX CONTRAST MEDICATION

## 2024-02-27 RX ADMIN — IOPAMIDOL 100 ML: 612 INJECTION, SOLUTION INTRAVENOUS at 16:21

## 2024-02-28 RX ORDER — AMLODIPINE BESYLATE 10 MG/1
TABLET ORAL
Qty: 90 TABLET | Refills: 3 | Status: SHIPPED | OUTPATIENT
Start: 2024-02-28

## 2024-05-28 ENCOUNTER — TELEPHONE (OUTPATIENT)
Age: 64
End: 2024-05-28

## 2024-05-28 NOTE — TELEPHONE ENCOUNTER
Pt called he states he received  his yearly  request for prior authorization for lansoprazole , pt said he spoke to his pharmacy and was told they notified Dr office ,hat he needs his prior auth and refill for lansoprazole

## 2024-07-23 ENCOUNTER — HOSPITAL ENCOUNTER (OUTPATIENT)
Facility: HOSPITAL | Age: 64
Setting detail: SPECIMEN
Discharge: HOME | End: 2024-07-26
Payer: COMMERCIAL

## 2024-07-23 DIAGNOSIS — E78.5 DYSLIPIDEMIA: ICD-10-CM

## 2024-07-23 DIAGNOSIS — Z12.5 SCREENING FOR MALIGNANT NEOPLASM OF PROSTATE: ICD-10-CM

## 2024-07-23 DIAGNOSIS — R73.01 IFG (IMPAIRED FASTING GLUCOSE): ICD-10-CM

## 2024-07-23 LAB
ALBUMIN SERPL-MCNC: 4.4 G/DL (ref 3.4–5)
ALBUMIN/GLOB SERPL: 1.4 (ref 0.8–1.7)
ALP SERPL-CCNC: 83 U/L (ref 45–117)
ALT SERPL-CCNC: 114 U/L (ref 16–61)
ANION GAP SERPL CALC-SCNC: 6 MMOL/L (ref 3–18)
AST SERPL-CCNC: 48 U/L (ref 10–38)
BILIRUB SERPL-MCNC: 1.2 MG/DL (ref 0.2–1)
BUN SERPL-MCNC: 16 MG/DL (ref 7–18)
BUN/CREAT SERPL: 16 (ref 12–20)
CALCIUM SERPL-MCNC: 9.2 MG/DL (ref 8.5–10.1)
CHLORIDE SERPL-SCNC: 106 MMOL/L (ref 100–111)
CHOLEST SERPL-MCNC: 136 MG/DL
CO2 SERPL-SCNC: 27 MMOL/L (ref 21–32)
CREAT SERPL-MCNC: 0.97 MG/DL (ref 0.6–1.3)
GLOBULIN SER CALC-MCNC: 3.1 G/DL (ref 2–4)
GLUCOSE SERPL-MCNC: 125 MG/DL (ref 74–99)
HBA1C MFR BLD: 5.3 % (ref 4.2–5.6)
HDLC SERPL-MCNC: 52 MG/DL (ref 40–60)
HDLC SERPL: 2.6 (ref 0–5)
LDLC SERPL CALC-MCNC: 60.6 MG/DL (ref 0–100)
LIPID PANEL: NORMAL
POTASSIUM SERPL-SCNC: 4.2 MMOL/L (ref 3.5–5.5)
PROT SERPL-MCNC: 7.5 G/DL (ref 6.4–8.2)
PSA SERPL-MCNC: 1.3 NG/ML (ref 0–4)
SODIUM SERPL-SCNC: 139 MMOL/L (ref 136–145)
TRIGL SERPL-MCNC: 117 MG/DL
VLDLC SERPL CALC-MCNC: 23.4 MG/DL

## 2024-07-23 PROCEDURE — 36415 COLL VENOUS BLD VENIPUNCTURE: CPT

## 2024-07-23 PROCEDURE — 80061 LIPID PANEL: CPT

## 2024-07-23 PROCEDURE — 83036 HEMOGLOBIN GLYCOSYLATED A1C: CPT

## 2024-07-23 PROCEDURE — 80053 COMPREHEN METABOLIC PANEL: CPT

## 2024-07-23 PROCEDURE — G0103 PSA SCREENING: HCPCS

## 2024-07-30 ENCOUNTER — OFFICE VISIT (OUTPATIENT)
Facility: CLINIC | Age: 64
End: 2024-07-30
Payer: COMMERCIAL

## 2024-07-30 VITALS
HEART RATE: 67 BPM | HEIGHT: 70 IN | OXYGEN SATURATION: 99 % | RESPIRATION RATE: 16 BRPM | TEMPERATURE: 98.6 F | BODY MASS INDEX: 31.5 KG/M2 | DIASTOLIC BLOOD PRESSURE: 81 MMHG | WEIGHT: 220 LBS | SYSTOLIC BLOOD PRESSURE: 129 MMHG

## 2024-07-30 DIAGNOSIS — E78.5 DYSLIPIDEMIA: ICD-10-CM

## 2024-07-30 DIAGNOSIS — K75.81 NASH (NONALCOHOLIC STEATOHEPATITIS): ICD-10-CM

## 2024-07-30 DIAGNOSIS — R73.01 IFG (IMPAIRED FASTING GLUCOSE): ICD-10-CM

## 2024-07-30 DIAGNOSIS — I10 PRIMARY HYPERTENSION: ICD-10-CM

## 2024-07-30 DIAGNOSIS — E66.9 OBESITY (BMI 30-39.9): ICD-10-CM

## 2024-07-30 DIAGNOSIS — R93.1 AGATSTON CAC SCORE 100-199: Primary | ICD-10-CM

## 2024-07-30 DIAGNOSIS — G47.33 OSA (OBSTRUCTIVE SLEEP APNEA): ICD-10-CM

## 2024-07-30 DIAGNOSIS — D12.6 COLON ADENOMA: ICD-10-CM

## 2024-07-30 PROCEDURE — 1123F ACP DISCUSS/DSCN MKR DOCD: CPT | Performed by: INTERNAL MEDICINE

## 2024-07-30 PROCEDURE — 3074F SYST BP LT 130 MM HG: CPT | Performed by: INTERNAL MEDICINE

## 2024-07-30 PROCEDURE — 3079F DIAST BP 80-89 MM HG: CPT | Performed by: INTERNAL MEDICINE

## 2024-07-30 PROCEDURE — 99214 OFFICE O/P EST MOD 30 MIN: CPT | Performed by: INTERNAL MEDICINE

## 2024-07-30 SDOH — ECONOMIC STABILITY: FOOD INSECURITY: WITHIN THE PAST 12 MONTHS, THE FOOD YOU BOUGHT JUST DIDN'T LAST AND YOU DIDN'T HAVE MONEY TO GET MORE.: NEVER TRUE

## 2024-07-30 SDOH — ECONOMIC STABILITY: INCOME INSECURITY: HOW HARD IS IT FOR YOU TO PAY FOR THE VERY BASICS LIKE FOOD, HOUSING, MEDICAL CARE, AND HEATING?: NOT HARD AT ALL

## 2024-07-30 SDOH — ECONOMIC STABILITY: FOOD INSECURITY: WITHIN THE PAST 12 MONTHS, YOU WORRIED THAT YOUR FOOD WOULD RUN OUT BEFORE YOU GOT MONEY TO BUY MORE.: NEVER TRUE

## 2024-07-30 ASSESSMENT — PATIENT HEALTH QUESTIONNAIRE - PHQ9
SUM OF ALL RESPONSES TO PHQ QUESTIONS 1-9: 1
SUM OF ALL RESPONSES TO PHQ9 QUESTIONS 1 & 2: 1
2. FEELING DOWN, DEPRESSED OR HOPELESS: SEVERAL DAYS
SUM OF ALL RESPONSES TO PHQ QUESTIONS 1-9: 1
1. LITTLE INTEREST OR PLEASURE IN DOING THINGS: NOT AT ALL

## 2024-07-30 NOTE — PROGRESS NOTES
Gordon JENSEN Knig presents today for   Chief Complaint   Patient presents with    Annual Exam       \"Have you been to the ER, urgent care clinic since your last visit?  Hospitalized since your last visit?\"    NO    “Have you seen or consulted any other health care providers outside of [facility] since your last visit?”    YES - When: approximately 4 months ago.  Where and Why: [facility], insomnia.    “Have you had a colorectal cancer screening such as a colonoscopy/FIT/Cologuard?    NO; scheduled for 08/14/2024    Date of last Colonoscopy: 8/13/2020  No cologuard on file  No FIT/FOBT on file   No flexible sigmoidoscopy on file              
he has acces at $60/mo but wanted to hold off  8. Erythrocytosis.  Follow up Dr Guthrie  9. ALEXIS.  Doing well and follow up Dr Fay  10.  Colon adenoma.  [place] pending      RTC 1/25    Above conditions discussed at length and patient vocalized understanding.  All questions answered to patient satisfaction     Diagnosis Orders   1. Agatston CAC score 100-199        2. Primary hypertension        3. ALEXIS (obstructive sleep apnea)        4. Colon adenoma        5. BANEGAS (nonalcoholic steatohepatitis)        6. IFG (impaired fasting glucose)  HEMOGLOBIN A1C W/O EAG    Comprehensive Metabolic Panel    CBC with Auto Differential      7. Dyslipidemia        8. Obesity (BMI 30-39.9)

## 2024-08-26 RX ORDER — LANSOPRAZOLE 30 MG/1
CAPSULE, DELAYED RELEASE ORAL
Qty: 90 CAPSULE | Refills: 3 | Status: SHIPPED | OUTPATIENT
Start: 2024-08-26

## 2024-08-30 RX ORDER — ATORVASTATIN CALCIUM 10 MG/1
TABLET, FILM COATED ORAL
Qty: 90 TABLET | Refills: 3 | Status: SHIPPED | OUTPATIENT
Start: 2024-08-30

## 2024-09-05 ENCOUNTER — TELEPHONE (OUTPATIENT)
Facility: CLINIC | Age: 64
End: 2024-09-05

## 2024-09-05 ENCOUNTER — OFFICE VISIT (OUTPATIENT)
Facility: CLINIC | Age: 64
End: 2024-09-05
Payer: COMMERCIAL

## 2024-09-05 VITALS
RESPIRATION RATE: 16 BRPM | DIASTOLIC BLOOD PRESSURE: 70 MMHG | SYSTOLIC BLOOD PRESSURE: 116 MMHG | HEART RATE: 59 BPM | WEIGHT: 218 LBS | HEIGHT: 70 IN | BODY MASS INDEX: 31.21 KG/M2 | TEMPERATURE: 98.4 F | OXYGEN SATURATION: 97 %

## 2024-09-05 DIAGNOSIS — L23.9 ALLERGIC CONTACT DERMATITIS, UNSPECIFIED TRIGGER: Primary | ICD-10-CM

## 2024-09-05 PROCEDURE — 3074F SYST BP LT 130 MM HG: CPT | Performed by: INTERNAL MEDICINE

## 2024-09-05 PROCEDURE — 3078F DIAST BP <80 MM HG: CPT | Performed by: INTERNAL MEDICINE

## 2024-09-05 PROCEDURE — 99213 OFFICE O/P EST LOW 20 MIN: CPT | Performed by: INTERNAL MEDICINE

## 2024-09-05 PROCEDURE — 1123F ACP DISCUSS/DSCN MKR DOCD: CPT | Performed by: INTERNAL MEDICINE

## 2024-09-05 RX ORDER — PREDNISONE 10 MG/1
TABLET ORAL
Qty: 1 EACH | Refills: 0 | Status: SHIPPED | OUTPATIENT
Start: 2024-09-05

## 2024-09-05 NOTE — PROGRESS NOTES
64 y.o. male who presents for evaluation.    He was cutting some bushes last week, wearing some shorts.  Within the next day, he started noticing onset of a rash in his bilateral legs extending to just below the line where his shorts ended.  He was wearing gloves when he was cutting the lawn, he had some scattered lesions just above the glove line.  Been using Benadryl topically and pills, minimal help.  No other known exposures.    Past Medical History:   Diagnosis Date    Agatston CAC score 100-199 03/2019    ca score 181; calcifications left main and LAD    BPH with obstruction/lower urinary tract symptoms 08/2014    I-PSS 13    Colon adenomas 08/13/2020    Dr Mckeon 2 adenomas    Colon polyp 09/2010    Dr David     COVID-19 virus infection 12/2021    Depression     Diastasis recti     Dyslipidemia     calculated 10 year risk score 7.6% (10/15); 6.4% (10/16); 8.7% (11/17); 13.5% (11/18); 13% (3/19)    ED (erectile dysfunction) 2019    Fatty liver     Us 8/14 negative serologies; Fib-4 was 0.87 from (7/14); [place] DR Mckeon; F1 on elastography 9/22    FHx: heart disease     GERD (gastroesophageal reflux disease)     EGD 10/14 Dr Reagan    Hypertension 11/15/2018    IFG (impaired fasting glucose) 06/2021    Left-sided tinnitus     and hearing loss; saw Dr Wagner 2010?    Nephrolithiasis     ALEXIS (obstructive sleep apnea) 10/2023    Dr Fay; AHI 13.4, min desats 87%    Overweight(278.02)     peak weight 207 lbs, bmi 29.8 from 8/14; IF 1/19 start weight 203 lbs but unable to do; keto in past also    Polycythemia 02/2024    Dr Guthrie; JAK2/epoNGS/MPL/CALR/BCR neg    Pulmonary nodule 03/2019    1.4 cm calcified; no change, no f/u rec     Umbilical hernia     and ventral hernia     Current Outpatient Medications   Medication Sig    predniSONE 10 MG (21) TBPK Taper down daily as directed    atorvastatin (LIPITOR) 10 MG tablet take 1 tablet by mouth once daily    lansoprazole (PREVACID) 30 MG delayed release capsule

## 2024-09-05 NOTE — PROGRESS NOTES
Gordon Malik presents today for   Chief Complaint   Patient presents with    Rash     On legs; onset about a week       \"Have you been to the ER, urgent care clinic since your last visit?  Hospitalized since your last visit?\"    NO    “Have you seen or consulted any other health care providers outside of [facility] since your last visit?”    NO    “Have you had a colorectal cancer screening such as a colonoscopy/FIT/Cologuard?    NO    Date of last Colonoscopy: 8/13/2020  No cologuard on file  No FIT/FOBT on file   No flexible sigmoidoscopy on file

## 2024-09-05 NOTE — TELEPHONE ENCOUNTER
Pt came into office and brought a bill for a copay on services dated for 7/30/24. Pt assumed this was a physical. Please advise if coding is correct.    Call back charo     Pt currently here for OV

## 2024-11-11 RX ORDER — LOSARTAN POTASSIUM 50 MG/1
TABLET ORAL
Qty: 90 TABLET | Refills: 3 | Status: SHIPPED | OUTPATIENT
Start: 2024-11-11

## 2025-01-31 ENCOUNTER — HOSPITAL ENCOUNTER (OUTPATIENT)
Facility: HOSPITAL | Age: 65
Setting detail: SPECIMEN
Discharge: HOME | End: 2025-01-31
Payer: COMMERCIAL

## 2025-01-31 DIAGNOSIS — R73.01 IFG (IMPAIRED FASTING GLUCOSE): ICD-10-CM

## 2025-01-31 LAB
ALBUMIN SERPL-MCNC: 4.5 G/DL (ref 3.4–5)
ALBUMIN/GLOB SERPL: 1.4 (ref 0.8–1.7)
ALP SERPL-CCNC: 84 U/L (ref 45–117)
ALT SERPL-CCNC: 95 U/L (ref 16–61)
ANION GAP SERPL CALC-SCNC: 4 MMOL/L (ref 3–18)
AST SERPL-CCNC: 37 U/L (ref 10–38)
BASOPHILS # BLD: 0.05 K/UL (ref 0–0.1)
BASOPHILS NFR BLD: 0.7 % (ref 0–2)
BILIRUB SERPL-MCNC: 1 MG/DL (ref 0.2–1)
BUN SERPL-MCNC: 17 MG/DL (ref 7–18)
BUN/CREAT SERPL: 16 (ref 12–20)
CALCIUM SERPL-MCNC: 9.8 MG/DL (ref 8.5–10.1)
CHLORIDE SERPL-SCNC: 107 MMOL/L (ref 100–111)
CO2 SERPL-SCNC: 30 MMOL/L (ref 21–32)
CREAT SERPL-MCNC: 1.08 MG/DL (ref 0.6–1.3)
DIFFERENTIAL METHOD BLD: ABNORMAL
EOSINOPHIL # BLD: 0.23 K/UL (ref 0–0.4)
EOSINOPHIL NFR BLD: 3.4 % (ref 0–5)
ERYTHROCYTE [DISTWIDTH] IN BLOOD BY AUTOMATED COUNT: 12.1 % (ref 11.6–14.5)
GLOBULIN SER CALC-MCNC: 3.2 G/DL (ref 2–4)
GLUCOSE SERPL-MCNC: 104 MG/DL (ref 74–99)
HBA1C MFR BLD: 5.5 % (ref 4.2–5.6)
HCT VFR BLD AUTO: 48.4 % (ref 36–48)
HGB BLD-MCNC: 16.5 G/DL (ref 13–16)
IMM GRANULOCYTES # BLD AUTO: 0.02 K/UL (ref 0–0.04)
IMM GRANULOCYTES NFR BLD AUTO: 0.3 % (ref 0–0.5)
LYMPHOCYTES # BLD: 1.91 K/UL (ref 0.9–3.6)
LYMPHOCYTES NFR BLD: 28.3 % (ref 21–52)
MCH RBC QN AUTO: 32.8 PG (ref 24–34)
MCHC RBC AUTO-ENTMCNC: 34.1 G/DL (ref 31–37)
MCV RBC AUTO: 96.2 FL (ref 78–100)
MONOCYTES # BLD: 0.62 K/UL (ref 0.05–1.2)
MONOCYTES NFR BLD: 9.2 % (ref 3–10)
NEUTS SEG # BLD: 3.93 K/UL (ref 1.8–8)
NEUTS SEG NFR BLD: 58.1 % (ref 40–73)
NRBC # BLD: 0 K/UL (ref 0–0.01)
NRBC BLD-RTO: 0 PER 100 WBC
PLATELET # BLD AUTO: 241 K/UL (ref 135–420)
PMV BLD AUTO: 10.4 FL (ref 9.2–11.8)
POTASSIUM SERPL-SCNC: 4.4 MMOL/L (ref 3.5–5.5)
PROT SERPL-MCNC: 7.7 G/DL (ref 6.4–8.2)
RBC # BLD AUTO: 5.03 M/UL (ref 4.35–5.65)
SODIUM SERPL-SCNC: 141 MMOL/L (ref 136–145)
WBC # BLD AUTO: 6.8 K/UL (ref 4.6–13.2)

## 2025-01-31 PROCEDURE — 85025 COMPLETE CBC W/AUTO DIFF WBC: CPT

## 2025-01-31 PROCEDURE — 83036 HEMOGLOBIN GLYCOSYLATED A1C: CPT

## 2025-01-31 PROCEDURE — 80053 COMPREHEN METABOLIC PANEL: CPT

## 2025-01-31 PROCEDURE — 36415 COLL VENOUS BLD VENIPUNCTURE: CPT

## 2025-02-07 ENCOUNTER — OFFICE VISIT (OUTPATIENT)
Facility: CLINIC | Age: 65
End: 2025-02-07
Payer: COMMERCIAL

## 2025-02-07 VITALS
HEART RATE: 68 BPM | SYSTOLIC BLOOD PRESSURE: 135 MMHG | DIASTOLIC BLOOD PRESSURE: 86 MMHG | TEMPERATURE: 98.8 F | OXYGEN SATURATION: 97 % | RESPIRATION RATE: 16 BRPM | HEIGHT: 70 IN | WEIGHT: 222 LBS | BODY MASS INDEX: 31.78 KG/M2

## 2025-02-07 DIAGNOSIS — R93.1 AGATSTON CAC SCORE 100-199: ICD-10-CM

## 2025-02-07 DIAGNOSIS — Z23 NEED FOR PROPHYLACTIC VACCINATION AGAINST DIPHTHERIA-TETANUS-PERTUSSIS (DTP): ICD-10-CM

## 2025-02-07 DIAGNOSIS — K75.81 METABOLIC DYSFUNCTION-ASSOCIATED STEATOHEPATITIS (MASH): ICD-10-CM

## 2025-02-07 DIAGNOSIS — I10 PRIMARY HYPERTENSION: ICD-10-CM

## 2025-02-07 DIAGNOSIS — K21.9 GASTROESOPHAGEAL REFLUX DISEASE WITHOUT ESOPHAGITIS: ICD-10-CM

## 2025-02-07 DIAGNOSIS — G47.33 OSA (OBSTRUCTIVE SLEEP APNEA): ICD-10-CM

## 2025-02-07 DIAGNOSIS — R73.01 IFG (IMPAIRED FASTING GLUCOSE): ICD-10-CM

## 2025-02-07 DIAGNOSIS — D12.6 COLON ADENOMA: ICD-10-CM

## 2025-02-07 DIAGNOSIS — E78.5 DYSLIPIDEMIA: ICD-10-CM

## 2025-02-07 DIAGNOSIS — Z00.00 PHYSICAL EXAM: Primary | ICD-10-CM

## 2025-02-07 PROCEDURE — 90471 IMMUNIZATION ADMIN: CPT | Performed by: INTERNAL MEDICINE

## 2025-02-07 PROCEDURE — 3079F DIAST BP 80-89 MM HG: CPT | Performed by: INTERNAL MEDICINE

## 2025-02-07 PROCEDURE — 3075F SYST BP GE 130 - 139MM HG: CPT | Performed by: INTERNAL MEDICINE

## 2025-02-07 PROCEDURE — 90715 TDAP VACCINE 7 YRS/> IM: CPT | Performed by: INTERNAL MEDICINE

## 2025-02-07 PROCEDURE — 99397 PER PM REEVAL EST PAT 65+ YR: CPT | Performed by: INTERNAL MEDICINE

## 2025-02-07 SDOH — ECONOMIC STABILITY: FOOD INSECURITY: WITHIN THE PAST 12 MONTHS, THE FOOD YOU BOUGHT JUST DIDN'T LAST AND YOU DIDN'T HAVE MONEY TO GET MORE.: NEVER TRUE

## 2025-02-07 SDOH — ECONOMIC STABILITY: FOOD INSECURITY: WITHIN THE PAST 12 MONTHS, YOU WORRIED THAT YOUR FOOD WOULD RUN OUT BEFORE YOU GOT MONEY TO BUY MORE.: NEVER TRUE

## 2025-02-07 ASSESSMENT — PATIENT HEALTH QUESTIONNAIRE - PHQ9
SUM OF ALL RESPONSES TO PHQ QUESTIONS 1-9: 0
SUM OF ALL RESPONSES TO PHQ9 QUESTIONS 1 & 2: 0
2. FEELING DOWN, DEPRESSED OR HOPELESS: NOT AT ALL
SUM OF ALL RESPONSES TO PHQ QUESTIONS 1-9: 0
SUM OF ALL RESPONSES TO PHQ QUESTIONS 1-9: 0
1. LITTLE INTEREST OR PLEASURE IN DOING THINGS: NOT AT ALL
SUM OF ALL RESPONSES TO PHQ QUESTIONS 1-9: 0

## 2025-02-07 NOTE — PROGRESS NOTES
Verbal order read back per Dr. Cornejo.  Patient received TDap vaccine in LEFT deltoid.  Patient tolerated well and left without complaints.  Patient received VIS.

## 2025-02-07 NOTE — PROGRESS NOTES
64 y.o. male who presents for evaluation    He's working on the diet and trying to lose weight.  His wife retired so doing better with cooking at home and healthier fare.  Unable to lose weight and actually climbing from low 200s     7/26/2023 1/30/2024 7/30/2024 9/5/2024 2/7/2025   Vitals        Weight - Scale 213 lb  224 lb  220 lb  218 lb  222 lb      No cardiovascular complaints. Bp controlled when he checks, they joined the [employer] a couple weeks ago    Denies gi or gu complaints, had colo Aug w 5yr recall    Having issues with the cpap, sees Dr Fay    Saw Dr Guthrie for the erythrocytosis and they are trending, felt to be from maria c?    Past Medical History:   Diagnosis Date    Agatston CAC score 100-199 03/2019    ca score 181; calcifications left main and LAD    BPH with obstruction/lower urinary tract symptoms 08/2014    I-PSS 13    Colon adenomas 08/13/2020    Dr Mckeon 2 adenomas    Colon polyp 09/2010    Dr David     COVID-19 virus infection 12/2021    Depression     Diastasis recti     Dyslipidemia     calculated 10 year risk score 7.6% (10/15); 6.4% (10/16); 8.7% (11/17); 13.5% (11/18); 13% (3/19)    ED (erectile dysfunction) 2019    FHx: heart disease     GERD (gastroesophageal reflux disease)     EGD 10/14 Dr Reagan    Hypertension 11/15/2018    IFG (impaired fasting glucose) 06/2021    Left-sided tinnitus     and hearing loss; saw Dr Wagner 2010?    Metabolic dysfunction-associated steatohepatitis (MASH)     fatty liver on US (8/14), neg serologies; Fib-4 0.87 (7/14); Dr Mckeon; F1 on elastography (9/22); Fib-4 1.04 (1/25)    Nephrolithiasis     MARIA C (obstructive sleep apnea) 10/2023    Dr Fay; AHI 13.4, min desats 87%    Overweight(278.02)     peak weight 207 lbs, bmi 29.8 from 8/14; IF 1/19 start weight 203 lbs but unable to do; keto in past also    Polycythemia 02/2024    Dr Guthrie; JAK2/epoNGS/MPL/CALR/BCR neg    Pulmonary nodule 03/2019    1.4 cm calcified; no change, no f/u rec     Umbilical

## 2025-02-07 NOTE — PROGRESS NOTES
Gordon Malik presents today for   Chief Complaint   Patient presents with    6 Month Follow-Up    Hypertension       \"Have you been to the ER, urgent care clinic since your last visit?  Hospitalized since your last visit?\"    NO    “Have you seen or consulted any other health care providers outside of [facility] since your last visit?”    NO

## 2025-02-24 RX ORDER — AMLODIPINE BESYLATE 10 MG/1
TABLET ORAL
Qty: 90 TABLET | Refills: 3 | Status: SHIPPED | OUTPATIENT
Start: 2025-02-24

## 2025-08-12 ENCOUNTER — HOSPITAL ENCOUNTER (OUTPATIENT)
Facility: HOSPITAL | Age: 65
Setting detail: SPECIMEN
Discharge: HOME | End: 2025-08-15
Payer: COMMERCIAL

## 2025-08-12 DIAGNOSIS — E78.5 DYSLIPIDEMIA: ICD-10-CM

## 2025-08-12 DIAGNOSIS — I10 PRIMARY HYPERTENSION: ICD-10-CM

## 2025-08-12 DIAGNOSIS — Z00.00 PHYSICAL EXAM: ICD-10-CM

## 2025-08-12 DIAGNOSIS — R73.01 IFG (IMPAIRED FASTING GLUCOSE): ICD-10-CM

## 2025-08-12 LAB
ALBUMIN SERPL-MCNC: 4.5 G/DL (ref 3.4–5)
ALBUMIN/GLOB SERPL: 1.5 (ref 0.8–1.7)
ALP SERPL-CCNC: 81 U/L (ref 45–117)
ALT SERPL-CCNC: 59 U/L (ref 10–50)
ANION GAP SERPL CALC-SCNC: 12 MMOL/L (ref 3–18)
AST SERPL-CCNC: 35 U/L (ref 10–38)
BASOPHILS # BLD: 0.04 K/UL (ref 0–0.1)
BASOPHILS NFR BLD: 0.6 % (ref 0–2)
BILIRUB SERPL-MCNC: 1.3 MG/DL (ref 0.2–1)
BUN SERPL-MCNC: 16 MG/DL (ref 6–23)
BUN/CREAT SERPL: 14 (ref 12–20)
CALCIUM SERPL-MCNC: 10.4 MG/DL (ref 8.5–10.1)
CHLORIDE SERPL-SCNC: 102 MMOL/L (ref 98–107)
CHOLEST SERPL-MCNC: 140 MG/DL
CO2 SERPL-SCNC: 28 MMOL/L (ref 21–32)
CREAT SERPL-MCNC: 1.14 MG/DL (ref 0.6–1.3)
DIFFERENTIAL METHOD BLD: ABNORMAL
EOSINOPHIL # BLD: 0.2 K/UL (ref 0–0.4)
EOSINOPHIL NFR BLD: 2.9 % (ref 0–5)
ERYTHROCYTE [DISTWIDTH] IN BLOOD BY AUTOMATED COUNT: 12.8 % (ref 11.6–14.5)
GLOBULIN SER CALC-MCNC: 2.9 G/DL (ref 2–4)
GLUCOSE SERPL-MCNC: 102 MG/DL (ref 74–108)
HBA1C MFR BLD: 5.5 % (ref 4.2–5.6)
HCT VFR BLD AUTO: 50 % (ref 36–48)
HDLC SERPL-MCNC: 45 MG/DL (ref 40–60)
HDLC SERPL: 3.1 (ref 0–5)
HGB BLD-MCNC: 17.1 G/DL (ref 13–16)
IMM GRANULOCYTES # BLD AUTO: 0.02 K/UL (ref 0–0.04)
IMM GRANULOCYTES NFR BLD AUTO: 0.3 % (ref 0–0.5)
LDLC SERPL CALC-MCNC: 73 MG/DL (ref 0–100)
LYMPHOCYTES # BLD: 1.77 K/UL (ref 0.9–3.6)
LYMPHOCYTES NFR BLD: 25.4 % (ref 21–52)
MCH RBC QN AUTO: 33.4 PG (ref 24–34)
MCHC RBC AUTO-ENTMCNC: 34.2 G/DL (ref 31–37)
MCV RBC AUTO: 97.7 FL (ref 78–100)
MONOCYTES # BLD: 0.66 K/UL (ref 0.05–1.2)
MONOCYTES NFR BLD: 9.5 % (ref 3–10)
NEUTS SEG # BLD: 4.28 K/UL (ref 1.8–8)
NEUTS SEG NFR BLD: 61.3 % (ref 40–73)
NRBC # BLD: 0 K/UL (ref 0–0.01)
NRBC BLD-RTO: 0 PER 100 WBC
PLATELET # BLD AUTO: 229 K/UL (ref 135–420)
PMV BLD AUTO: 10.6 FL (ref 9.2–11.8)
POTASSIUM SERPL-SCNC: 4.5 MMOL/L (ref 3.5–5.5)
PROT SERPL-MCNC: 7.3 G/DL (ref 6.4–8.2)
PSA SERPL-MCNC: 1.3 NG/ML (ref 1.4–4.4)
RBC # BLD AUTO: 5.12 M/UL (ref 4.35–5.65)
SODIUM SERPL-SCNC: 142 MMOL/L (ref 136–145)
TRIGL SERPL-MCNC: 112 MG/DL (ref 0–150)
VLDLC SERPL CALC-MCNC: 22 MG/DL
WBC # BLD AUTO: 7 K/UL (ref 4.6–13.2)

## 2025-08-12 PROCEDURE — 36415 COLL VENOUS BLD VENIPUNCTURE: CPT

## 2025-08-12 PROCEDURE — 80053 COMPREHEN METABOLIC PANEL: CPT

## 2025-08-12 PROCEDURE — 83036 HEMOGLOBIN GLYCOSYLATED A1C: CPT

## 2025-08-12 PROCEDURE — G0103 PSA SCREENING: HCPCS

## 2025-08-12 PROCEDURE — 85025 COMPLETE CBC W/AUTO DIFF WBC: CPT

## 2025-08-12 PROCEDURE — 80061 LIPID PANEL: CPT

## 2025-08-15 DIAGNOSIS — I10 PRIMARY HYPERTENSION: Primary | ICD-10-CM

## 2025-08-17 RX ORDER — LOSARTAN POTASSIUM 50 MG/1
50 TABLET ORAL DAILY
Qty: 90 TABLET | Refills: 3 | Status: SHIPPED | OUTPATIENT
Start: 2025-08-17

## 2025-08-17 RX ORDER — AMLODIPINE BESYLATE 10 MG/1
10 TABLET ORAL DAILY
Qty: 90 TABLET | Refills: 3 | Status: SHIPPED | OUTPATIENT
Start: 2025-08-17 | End: 2025-08-17 | Stop reason: SDUPTHER

## 2025-08-17 RX ORDER — LOSARTAN POTASSIUM 50 MG/1
50 TABLET ORAL DAILY
Qty: 90 TABLET | Refills: 3 | Status: SHIPPED | OUTPATIENT
Start: 2025-08-17 | End: 2025-08-17 | Stop reason: SDUPTHER

## 2025-08-17 RX ORDER — AMLODIPINE BESYLATE 10 MG/1
10 TABLET ORAL DAILY
Qty: 90 TABLET | Refills: 3 | Status: SHIPPED | OUTPATIENT
Start: 2025-08-17

## 2025-08-19 ENCOUNTER — OFFICE VISIT (OUTPATIENT)
Facility: CLINIC | Age: 65
End: 2025-08-19
Payer: COMMERCIAL

## 2025-08-19 VITALS
HEART RATE: 64 BPM | DIASTOLIC BLOOD PRESSURE: 74 MMHG | SYSTOLIC BLOOD PRESSURE: 136 MMHG | TEMPERATURE: 98.4 F | OXYGEN SATURATION: 97 % | HEIGHT: 70 IN | WEIGHT: 220 LBS | RESPIRATION RATE: 16 BRPM | BODY MASS INDEX: 31.5 KG/M2

## 2025-08-19 DIAGNOSIS — E78.5 DYSLIPIDEMIA: ICD-10-CM

## 2025-08-19 DIAGNOSIS — R93.1 AGATSTON CAC SCORE 100-199: Primary | ICD-10-CM

## 2025-08-19 DIAGNOSIS — D12.6 COLON ADENOMA: ICD-10-CM

## 2025-08-19 DIAGNOSIS — K75.81 METABOLIC DYSFUNCTION-ASSOCIATED STEATOHEPATITIS (MASH): ICD-10-CM

## 2025-08-19 DIAGNOSIS — I10 PRIMARY HYPERTENSION: ICD-10-CM

## 2025-08-19 DIAGNOSIS — R73.01 IFG (IMPAIRED FASTING GLUCOSE): ICD-10-CM

## 2025-08-19 DIAGNOSIS — E66.9 OBESITY (BMI 30-39.9): ICD-10-CM

## 2025-08-19 PROCEDURE — 3078F DIAST BP <80 MM HG: CPT | Performed by: INTERNAL MEDICINE

## 2025-08-19 PROCEDURE — 1123F ACP DISCUSS/DSCN MKR DOCD: CPT | Performed by: INTERNAL MEDICINE

## 2025-08-19 PROCEDURE — 99214 OFFICE O/P EST MOD 30 MIN: CPT | Performed by: INTERNAL MEDICINE

## 2025-08-19 PROCEDURE — 3075F SYST BP GE 130 - 139MM HG: CPT | Performed by: INTERNAL MEDICINE

## 2025-08-19 RX ORDER — LANSOPRAZOLE 30 MG/1
30 CAPSULE, DELAYED RELEASE ORAL
Qty: 90 CAPSULE | Refills: 3 | Status: SHIPPED | OUTPATIENT
Start: 2025-08-19